# Patient Record
Sex: MALE | Race: OTHER | HISPANIC OR LATINO | Employment: UNEMPLOYED | URBAN - METROPOLITAN AREA
[De-identification: names, ages, dates, MRNs, and addresses within clinical notes are randomized per-mention and may not be internally consistent; named-entity substitution may affect disease eponyms.]

---

## 2023-04-13 ENCOUNTER — HOSPITAL ENCOUNTER (INPATIENT)
Facility: MEDICAL CENTER | Age: 18
LOS: 4 days | DRG: 123 | End: 2023-04-18
Attending: EMERGENCY MEDICINE | Admitting: FAMILY MEDICINE
Payer: COMMERCIAL

## 2023-04-13 DIAGNOSIS — H49.21 RIGHT ABDUCENS NERVE PALSY: ICD-10-CM

## 2023-04-13 DIAGNOSIS — H49.21 LATERAL RECTUS PALSY, RIGHT: ICD-10-CM

## 2023-04-13 PROBLEM — H49.20 LATERAL RECTUS PALSY: Status: ACTIVE | Noted: 2023-04-13

## 2023-04-13 LAB
ALBUMIN SERPL BCP-MCNC: 4.3 G/DL (ref 3.2–4.9)
ALBUMIN/GLOB SERPL: 1.3 G/DL
ALP SERPL-CCNC: 120 U/L (ref 30–99)
ALT SERPL-CCNC: 23 U/L (ref 2–50)
ANION GAP SERPL CALC-SCNC: 11 MMOL/L (ref 7–16)
AST SERPL-CCNC: 15 U/L (ref 12–45)
BASOPHILS # BLD AUTO: 0.4 % (ref 0–1.8)
BASOPHILS # BLD: 0.05 K/UL (ref 0–0.12)
BILIRUB SERPL-MCNC: 0.5 MG/DL (ref 0.1–1.5)
BUN SERPL-MCNC: 12 MG/DL (ref 8–22)
CALCIUM ALBUM COR SERPL-MCNC: 9.1 MG/DL (ref 8.5–10.5)
CALCIUM SERPL-MCNC: 9.3 MG/DL (ref 8.5–10.5)
CHLORIDE SERPL-SCNC: 107 MMOL/L (ref 96–112)
CO2 SERPL-SCNC: 24 MMOL/L (ref 20–33)
CREAT SERPL-MCNC: 0.66 MG/DL (ref 0.5–1.4)
CRP SERPL HS-MCNC: <0.3 MG/DL (ref 0–0.75)
EOSINOPHIL # BLD AUTO: 0.36 K/UL (ref 0–0.51)
EOSINOPHIL NFR BLD: 2.8 % (ref 0–6.9)
ERYTHROCYTE [DISTWIDTH] IN BLOOD BY AUTOMATED COUNT: 42.6 FL (ref 35.9–50)
ERYTHROCYTE [SEDIMENTATION RATE] IN BLOOD BY WESTERGREN METHOD: 5 MM/HOUR (ref 0–20)
GFR SERPLBLD CREATININE-BSD FMLA CKD-EPI: 138 ML/MIN/1.73 M 2
GLOBULIN SER CALC-MCNC: 3.2 G/DL (ref 1.9–3.5)
GLUCOSE SERPL-MCNC: 89 MG/DL (ref 65–99)
HCT VFR BLD AUTO: 47.3 % (ref 42–52)
HGB BLD-MCNC: 15.8 G/DL (ref 14–18)
IMM GRANULOCYTES # BLD AUTO: 0.07 K/UL (ref 0–0.11)
IMM GRANULOCYTES NFR BLD AUTO: 0.5 % (ref 0–0.9)
LYMPHOCYTES # BLD AUTO: 2.56 K/UL (ref 1–4.8)
LYMPHOCYTES NFR BLD: 19.6 % (ref 22–41)
MCH RBC QN AUTO: 30.7 PG (ref 27–33)
MCHC RBC AUTO-ENTMCNC: 33.4 G/DL (ref 33.7–35.3)
MCV RBC AUTO: 92 FL (ref 81.4–97.8)
MONOCYTES # BLD AUTO: 0.9 K/UL (ref 0–0.85)
MONOCYTES NFR BLD AUTO: 6.9 % (ref 0–13.4)
NEUTROPHILS # BLD AUTO: 9.12 K/UL (ref 1.82–7.42)
NEUTROPHILS NFR BLD: 69.8 % (ref 44–72)
NRBC # BLD AUTO: 0 K/UL
NRBC BLD-RTO: 0 /100 WBC
PLATELET # BLD AUTO: 285 K/UL (ref 164–446)
PMV BLD AUTO: 9.6 FL (ref 9–12.9)
POTASSIUM SERPL-SCNC: 4 MMOL/L (ref 3.6–5.5)
PROT SERPL-MCNC: 7.5 G/DL (ref 6–8.2)
RBC # BLD AUTO: 5.14 M/UL (ref 4.7–6.1)
SODIUM SERPL-SCNC: 142 MMOL/L (ref 135–145)
TSH SERPL DL<=0.005 MIU/L-ACNC: 0.49 UIU/ML (ref 0.38–5.33)
VIT B12 SERPL-MCNC: >4000 PG/ML (ref 211–911)
WBC # BLD AUTO: 13.1 K/UL (ref 4.8–10.8)

## 2023-04-13 PROCEDURE — 700101 HCHG RX REV CODE 250: Performed by: EMERGENCY MEDICINE

## 2023-04-13 PROCEDURE — 85652 RBC SED RATE AUTOMATED: CPT

## 2023-04-13 PROCEDURE — 86140 C-REACTIVE PROTEIN: CPT

## 2023-04-13 PROCEDURE — A9270 NON-COVERED ITEM OR SERVICE: HCPCS | Performed by: FAMILY MEDICINE

## 2023-04-13 PROCEDURE — 85025 COMPLETE CBC W/AUTO DIFF WBC: CPT

## 2023-04-13 PROCEDURE — 700102 HCHG RX REV CODE 250 W/ 637 OVERRIDE(OP): Performed by: FAMILY MEDICINE

## 2023-04-13 PROCEDURE — G0378 HOSPITAL OBSERVATION PER HR: HCPCS

## 2023-04-13 PROCEDURE — 80053 COMPREHEN METABOLIC PANEL: CPT

## 2023-04-13 PROCEDURE — 84443 ASSAY THYROID STIM HORMONE: CPT

## 2023-04-13 PROCEDURE — 99285 EMERGENCY DEPT VISIT HI MDM: CPT

## 2023-04-13 PROCEDURE — 99222 1ST HOSP IP/OBS MODERATE 55: CPT | Performed by: FAMILY MEDICINE

## 2023-04-13 PROCEDURE — 82607 VITAMIN B-12: CPT

## 2023-04-13 RX ORDER — PROMETHAZINE HYDROCHLORIDE 25 MG/1
12.5-25 TABLET ORAL EVERY 4 HOURS PRN
Status: DISCONTINUED | OUTPATIENT
Start: 2023-04-13 | End: 2023-04-18 | Stop reason: HOSPADM

## 2023-04-13 RX ORDER — OXYCODONE HYDROCHLORIDE 5 MG/1
5 TABLET ORAL
Status: DISCONTINUED | OUTPATIENT
Start: 2023-04-13 | End: 2023-04-18 | Stop reason: HOSPADM

## 2023-04-13 RX ORDER — ONDANSETRON 4 MG/1
4 TABLET, ORALLY DISINTEGRATING ORAL EVERY 4 HOURS PRN
Status: DISCONTINUED | OUTPATIENT
Start: 2023-04-13 | End: 2023-04-18 | Stop reason: HOSPADM

## 2023-04-13 RX ORDER — PROCHLORPERAZINE EDISYLATE 5 MG/ML
5-10 INJECTION INTRAMUSCULAR; INTRAVENOUS EVERY 4 HOURS PRN
Status: DISCONTINUED | OUTPATIENT
Start: 2023-04-13 | End: 2023-04-18 | Stop reason: HOSPADM

## 2023-04-13 RX ORDER — BISACODYL 10 MG
10 SUPPOSITORY, RECTAL RECTAL
Status: DISCONTINUED | OUTPATIENT
Start: 2023-04-13 | End: 2023-04-18 | Stop reason: HOSPADM

## 2023-04-13 RX ORDER — POLYETHYLENE GLYCOL 3350 17 G/17G
1 POWDER, FOR SOLUTION ORAL
Status: DISCONTINUED | OUTPATIENT
Start: 2023-04-13 | End: 2023-04-18 | Stop reason: HOSPADM

## 2023-04-13 RX ORDER — PROPARACAINE HYDROCHLORIDE 5 MG/ML
1 SOLUTION/ DROPS OPHTHALMIC ONCE
Status: COMPLETED | OUTPATIENT
Start: 2023-04-13 | End: 2023-04-13

## 2023-04-13 RX ORDER — AMOXICILLIN 250 MG
2 CAPSULE ORAL 2 TIMES DAILY
Status: DISCONTINUED | OUTPATIENT
Start: 2023-04-13 | End: 2023-04-18 | Stop reason: HOSPADM

## 2023-04-13 RX ORDER — OXYCODONE HYDROCHLORIDE 5 MG/1
2.5 TABLET ORAL
Status: DISCONTINUED | OUTPATIENT
Start: 2023-04-13 | End: 2023-04-18 | Stop reason: HOSPADM

## 2023-04-13 RX ORDER — MORPHINE SULFATE 4 MG/ML
2 INJECTION INTRAVENOUS
Status: DISCONTINUED | OUTPATIENT
Start: 2023-04-13 | End: 2023-04-18 | Stop reason: HOSPADM

## 2023-04-13 RX ORDER — ACETAMINOPHEN 325 MG/1
650 TABLET ORAL EVERY 6 HOURS PRN
Status: DISCONTINUED | OUTPATIENT
Start: 2023-04-13 | End: 2023-04-18 | Stop reason: HOSPADM

## 2023-04-13 RX ORDER — ONDANSETRON 2 MG/ML
4 INJECTION INTRAMUSCULAR; INTRAVENOUS EVERY 4 HOURS PRN
Status: DISCONTINUED | OUTPATIENT
Start: 2023-04-13 | End: 2023-04-18 | Stop reason: HOSPADM

## 2023-04-13 RX ORDER — PROMETHAZINE HYDROCHLORIDE 12.5 MG/1
12.5-25 SUPPOSITORY RECTAL EVERY 4 HOURS PRN
Status: DISCONTINUED | OUTPATIENT
Start: 2023-04-13 | End: 2023-04-18 | Stop reason: HOSPADM

## 2023-04-13 RX ADMIN — FLUORESCEIN SODIUM 1 MG: 1 STRIP OPHTHALMIC at 12:30

## 2023-04-13 RX ADMIN — DOCUSATE SODIUM 50 MG AND SENNOSIDES 8.6 MG 2 TABLET: 8.6; 5 TABLET, FILM COATED ORAL at 18:00

## 2023-04-13 RX ADMIN — OXYCODONE 5 MG: 5 TABLET ORAL at 21:22

## 2023-04-13 RX ADMIN — OXYCODONE 2.5 MG: 5 TABLET ORAL at 17:41

## 2023-04-13 RX ADMIN — PROPARACAINE HYDROCHLORIDE 1 DROP: 5 SOLUTION/ DROPS OPHTHALMIC at 12:30

## 2023-04-13 ASSESSMENT — ENCOUNTER SYMPTOMS
SENSORY CHANGE: 0
WHEEZING: 0
ABDOMINAL PAIN: 0
CHILLS: 0
TREMORS: 0
FLANK PAIN: 0
MYALGIAS: 0
EYE DISCHARGE: 0
VOMITING: 0
DIAPHORESIS: 0
FEVER: 0
EYE PAIN: 1
HEARTBURN: 0
EYE REDNESS: 0
SHORTNESS OF BREATH: 0
PALPITATIONS: 0
COUGH: 0
DOUBLE VISION: 1
BLURRED VISION: 0
DIZZINESS: 1
TINGLING: 0
BACK PAIN: 0
DIARRHEA: 0
NERVOUS/ANXIOUS: 0
WEAKNESS: 0
NECK PAIN: 0
FOCAL WEAKNESS: 0
HEADACHES: 1
SPEECH CHANGE: 0
NAUSEA: 0
SORE THROAT: 0

## 2023-04-13 ASSESSMENT — LIFESTYLE VARIABLES
ALCOHOL_USE: NO
ON A TYPICAL DAY WHEN YOU DRINK ALCOHOL HOW MANY DRINKS DO YOU HAVE: 0
CONSUMPTION TOTAL: NEGATIVE
TOTAL SCORE: 0
HOW MANY TIMES IN THE PAST YEAR HAVE YOU HAD 5 OR MORE DRINKS IN A DAY: 0
HAVE PEOPLE ANNOYED YOU BY CRITICIZING YOUR DRINKING: NO
DOES PATIENT WANT TO STOP DRINKING: NO
HAVE YOU EVER FELT YOU SHOULD CUT DOWN ON YOUR DRINKING: NO
EVER HAD A DRINK FIRST THING IN THE MORNING TO STEADY YOUR NERVES TO GET RID OF A HANGOVER: NO
TOTAL SCORE: 0
TOTAL SCORE: 0
EVER FELT BAD OR GUILTY ABOUT YOUR DRINKING: NO
AVERAGE NUMBER OF DAYS PER WEEK YOU HAVE A DRINK CONTAINING ALCOHOL: 0

## 2023-04-13 ASSESSMENT — PATIENT HEALTH QUESTIONNAIRE - PHQ9
2. FEELING DOWN, DEPRESSED, IRRITABLE, OR HOPELESS: NOT AT ALL
SUM OF ALL RESPONSES TO PHQ9 QUESTIONS 1 AND 2: 0
1. LITTLE INTEREST OR PLEASURE IN DOING THINGS: NOT AT ALL

## 2023-04-13 ASSESSMENT — PAIN DESCRIPTION - PAIN TYPE
TYPE: ACUTE PAIN

## 2023-04-13 NOTE — PROGRESS NOTES
4 Eyes Skin Assessment Completed by Holland RN and Patricia RN.    Head WDL  Ears WDL  Nose WDL  Mouth WDL  Neck WDL  Breast/Chest WDL  Shoulder Blades WDL  Spine WDL  (R) Arm/Elbow/Hand WDL  (L) Arm/Elbow/Hand WDL  Abdomen WDL  Groin WDL  Scrotum/Coccyx/Buttocks WDL  (R) Leg WDL  (L) Leg WDL  (R) Heel/Foot/Toe WDL  (L) Heel/Foot/Toe WDL    Devices In Places Blood Pressure Cuff    Interventions In Place Pillows    Possible Skin Injury No    Pictures Uploaded Into Epic N/A  Wound Consult Placed N/A  RN Wound Prevention Protocol Ordered No

## 2023-04-13 NOTE — ASSESSMENT & PLAN NOTE
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

## 2023-04-13 NOTE — ED TRIAGE NOTES
"Chief Complaint   Patient presents with    Eye Pain     Symptoms started 3 weeks ago. Right eye. Blurred vision when pt turns head to the right. Associated with sensation of pain/numbness surround the right eye.        /74   Pulse 77   Temp 36.4 °C (97.6 °F) (Temporal)   Resp 18   Ht 1.8 m (5' 10.87\")   Wt 89.2 kg (196 lb 10.4 oz)   SpO2 98%   BMI 27.53 kg/m²     "

## 2023-04-13 NOTE — ED PROVIDER NOTES
ED Provider Note    CHIEF COMPLAINT  Chief Complaint   Patient presents with    Eye Pain     Symptoms started 3 weeks ago. Right eye. Blurred vision when pt turns head to the right. Associated with sensation of pain/numbness surround the right eye.        EXTERNAL RECORDS REVIEWED  External ED Note P patient was seen at [facility] last week for the same symptoms.  CT and MRI were normal.    HPI/ROS  LIMITATION TO HISTORY   Select: : None  OUTSIDE HISTORIAN(S):  Parent Mother who is at bedside.    Lopez Engle is a 18 y.o. male who presents to the ED from [facility] for Opthalmology referral secondary to right eye pain onset three weeks ago. He also has associated double vision when he turns his head to the side. The patient is a  and recalls 3 weeks ago, when he was using a marcial, he had sudden onset pain in the eye when he attempted to turn his head to the right. He also had pain in the area around his eye and into the right temporal region whenever he would rotate his head to the right. His vision and pain subside when he is looking straight on. He was seen at [facility] today, where labs and an MRI and CT were performed, and were non concerning. He was discharged with a lateral rectus palsy diagnosis. He denies any associated fever or signs of infection.    PAST MEDICAL HISTORY   None noted on chart review    SURGICAL HISTORY  patient denies any surgical history    FAMILY HISTORY  History reviewed. No pertinent family history.    SOCIAL HISTORY  Social History     Tobacco Use    Smoking status: Never    Smokeless tobacco: Never   Vaping Use    Vaping Use: Never used   Substance and Sexual Activity    Alcohol use: Not Currently    Drug use: Not Currently    Sexual activity: None noted       CURRENT MEDICATIONS  Home Medications       Reviewed by Rodríguez Kearney (Pharmacy Tech) on 04/13/23 at 1329  Med List Status: Complete     Medication Last Dose Status        Patient Moustapha Taking any Medications       "                     ALLERGIES  No Known Allergies    PHYSICAL EXAM  VITAL SIGNS: /72   Pulse 83   Temp 36.7 °C (98.1 °F) (Temporal)   Resp 16   Ht 1.8 m (5' 10.87\")   Wt 89.2 kg (196 lb 10.4 oz)   SpO2 99%   BMI 27.53 kg/m²    Constitutional: Alert in no apparent distress.  HENT: No signs of trauma, Bilateral external ears normal, Nose normal.   Eyes: Eye pressure is normal. No abnormality on slip light exam. No uptake of fluorescein. Pupils are equal and reactive, Conjunctiva normal, Non-icteric.   Cardiovascular: Regular rate and rhythm, no murmurs.   Thorax & Lungs: Normal breath sounds, No respiratory distress, No wheezing, No chest tenderness.   Skin: Warm, Dry, No erythema, No rash.   Musculoskeletal:  No major deformities noted.   Neurologic: Alert, moving all extremities without difficulty, no focal deficits.      DIAGNOSTIC STUDIES / PROCEDURES    LABS  Labs Reviewed - No data to display        COURSE & MEDICAL DECISION MAKING    ED Observation Status? No.    12:24 PM - Patient seen and examined at bedside. Performed eye exam with the fluorescent slit lamp at bedside. Discussed plan of care, including eventual discharge with ophthalmology referral. Patient agrees to the plan of care.     12:50 PM I discussed the patient's case and the above findings with Dr. Lee (ophthalmology) who is concerned for MS due to nerve palsy in a young person. He would like the patient to undergo an MS work up at this time. He advises ordering an MRI of the brain and orbits with and without contrast.    1:17 PM- Informed patient and patient's mother of the plan for hospitalization for a full MS work up. Lab work will be ordered at this time. Patient is agreeable to this plan.    1:24 PM I discussed the patient's case and the above findings with Dr. Frank (hospitalist) who agrees to hospitalize the patient and take over the plan of care moving forward.     INITIAL ASSESSMENT, COURSE AND PLAN  Care Narrative: " This is a 18-year-old who presents with 3 weeks of a right lateral rectus palsy.  Patient has normal pressures and no evidence of corneal abrasion or abnormality on slit-lamp and fluorescein exams.  He has normal pressures of his eyes bilaterally.  Given the lateral rectus palsy I did speak with Dr. Lee, ophthalmology who recommended doing an MS work-up.  He recommended a MRI of the head and orbits with and without contrast to look for this.  He can be contacted if there is not any neurologic cause of the patient's symptoms.  We do have neurology if he does have MS that he can see.  I do not think this is an emergent need to call and consult neurology as this has been going on for 3 weeks.  I do think he needs hospitalization for this work-up.          DISPOSITION AND DISCUSSIONS  I have discussed management of the patient with the following physicians and SHRADDHA's:  Dr. Frank (hospitalist)    Discussion of management with other [facility] or appropriate source(s):  Dr. Lee (Ophthalmology)       Escalation of care considered, and ultimately not performed:diagnostic imaging    Barriers to care at this time, including but not limited to:  Patient has not followed up with outpatient neurology or ophthalmology because he does not have these services established. .     FINAL DIAGNOSIS  1. Lateral rectus palsy, right          ILisa (Jamel), am scribing for, and in the presence of, Valeri Marcial M.D..    Electronically signed by: Lisa Carrillo (Jamel), 4/13/2023    Valeri PUENTE M.D. personally performed the services described in this documentation, as scribed by Lisa Carrillo in my presence, and it is both accurate and complete.    Electronically signed by: Valeri Marcial M.D., 4/13/2023 12:02 PM

## 2023-04-13 NOTE — ED NOTES
ERP at bedside now. Opthaine and ophthalmic strip at bedside along with slit lamp. No other needs.

## 2023-04-13 NOTE — H&P
Hospital Medicine History & Physical Note    Date of Service  4/13/2023    Primary Care Physician  No primary care provider on file.    Consultants  ophthalmology    Specialist Names: Jesus    Code Status  Full Code    Chief Complaint  Chief Complaint   Patient presents with    Eye Pain     Symptoms started 3 weeks ago. Right eye. Blurred vision when pt turns head to the right. Associated with sensation of pain/numbness surround the right eye.        History of Presenting Illness  Lopez Engle is a 18 y.o. male who presented 4/13/2023 with diplopia.  Patient with diplopia or double vision with right eye pain for the past 3 weeks.  He was seen in outside facility where MRI of the brain was noted to be negative.  He was told that he needed to go to our facility for further work-up if it does not resolve.  Patient continues to have diplopia, appears to have right lateral rectus palsy.  ED called ophthalmology who recommended MRI of brain and orbits with and without contrast for further work-up.  If MRIs are negative, ophthalmology asked to be reconsulted or called back.  Patient with some headache and dizziness, he denies any other symptoms.  He has no demonstrable numbness or weakness of both upper and lower extremities.    I discussed the plan of care with patient and family.    A qualified  was used to interpret [place] during this encounter.  ’s name/ID number was [id number] and mode of interpretation was iPAD.     Review of Systems  Review of Systems   Constitutional:  Negative for chills, diaphoresis, fever and malaise/fatigue.   HENT:  Negative for congestion, hearing loss and sore throat.    Eyes:  Positive for double vision and pain. Negative for blurred vision, discharge and redness.   Respiratory:  Negative for cough, shortness of breath and wheezing.    Cardiovascular:  Negative for chest pain, palpitations and leg swelling.   Gastrointestinal:  Negative for abdominal pain,  diarrhea, heartburn, nausea and vomiting.   Genitourinary:  Negative for dysuria, flank pain and hematuria.   Musculoskeletal:  Negative for back pain, joint pain, myalgias and neck pain.   Skin:  Negative for rash.   Neurological:  Positive for dizziness and headaches. Negative for tingling, tremors, sensory change, speech change, focal weakness and weakness.   Psychiatric/Behavioral:  The patient is not nervous/anxious.      Past Medical History   has a past medical history of Lateral rectus palsy.    Surgical History   has a past surgical history that includes no pertinent past surgical history.     Family History  Family history is unknown by patient.   Family history reviewed with patient. There is no family history that is pertinent to the chief complaint.     Social History   reports that he has never smoked. He has never used smokeless tobacco. He reports that he does not currently use alcohol. He reports that he does not currently use drugs.    Allergies  No Known Allergies    Medications  None       Physical Exam  Temp:  [36.4 °C (97.6 °F)-37.1 °C (98.7 °F)] 37.1 °C (98.7 °F)  Pulse:  [62-83] 68  Resp:  [16-18] 16  BP: (125-151)/(60-84) 136/84  SpO2:  [97 %-100 %] 97 %  Blood Pressure: 136/84   Temperature: 37.1 °C (98.7 °F)   Pulse: 68   Respiration: 16   Pulse Oximetry: 97 %       Physical Exam  Vitals and nursing note reviewed.   HENT:      Head: Normocephalic and atraumatic.      Nose: No congestion.      Mouth/Throat:      Mouth: Mucous membranes are moist.   Eyes:      Conjunctiva/sclera: Conjunctivae normal.      Right eye: Right conjunctiva is not injected. No chemosis, exudate or hemorrhage.     Left eye: Left conjunctiva is not injected. No chemosis, exudate or hemorrhage.     Pupils: Pupils are equal, round, and reactive to light.      Comments: OD with no lateral deviation   Cardiovascular:      Rate and Rhythm: Normal rate and regular rhythm.   Pulmonary:      Effort: Pulmonary effort is  normal.      Breath sounds: Normal breath sounds.   Abdominal:      General: There is no distension.      Tenderness: There is no abdominal tenderness. There is no guarding or rebound.   Musculoskeletal:      Cervical back: No tenderness.   Neurological:      General: No focal deficit present.      Mental Status: He is alert and oriented to person, place, and time.      Cranial Nerves: Cranial nerve deficit (OD with no lateral deviation) present.       Laboratory:          No results for input(s): ALTSGPT, ASTSGOT, ALKPHOSPHAT, TBILIRUBIN, DBILIRUBIN, GAMMAGT, AMYLASE, LIPASE, ALB, PREALBUMIN, GLUCOSE in the last 72 hours.      No results for input(s): NTPROBNP in the last 72 hours.      No results for input(s): TROPONINT in the last 72 hours.    Imaging:  MR-BRAIN-WITH & W/O    (Results Pending)   MR-ORBITS, FACE, NECK-WITH    (Results Pending)       no X-Ray or EKG requiring interpretation    Assessment/Plan:  Justification for Admission Status  I anticipate this patient is appropriate for observation status at this time because lateral rectus palsy work-up    Patient will need a Med/Surg bed on MEDICAL service .  The need is secondary to lateral rectus palsy.    * Lateral rectus palsy- (present on admission)  Assessment & Plan  Neurochecks  Check CBC, CMP, vitamin B12, TSH, ESR, CRP  Check MRI brain and orbits  May need neurology versus ophthalmology consults        VTE prophylaxis: SCDs/TEDs

## 2023-04-13 NOTE — ED NOTES
This RN attempted to call report and spoke to Sherif. RN unavailable and to call me back for report.     Transport at bedside now to take patient.

## 2023-04-14 ENCOUNTER — APPOINTMENT (OUTPATIENT)
Dept: RADIOLOGY | Facility: MEDICAL CENTER | Age: 18
DRG: 123 | End: 2023-04-14
Attending: FAMILY MEDICINE
Payer: COMMERCIAL

## 2023-04-14 ENCOUNTER — APPOINTMENT (OUTPATIENT)
Dept: RADIOLOGY | Facility: MEDICAL CENTER | Age: 18
DRG: 123 | End: 2023-04-14
Attending: PSYCHIATRY & NEUROLOGY
Payer: COMMERCIAL

## 2023-04-14 LAB
GLUCOSE CSF-MCNC: 66 MG/DL (ref 40–80)
GRAM STN SPEC: NORMAL
HIV 1+2 AB+HIV1 P24 AG SERPL QL IA: NORMAL
PROT CSF-MCNC: 24 MG/DL (ref 15–45)
SIGNIFICANT IND 70042: NORMAL
SITE SITE: NORMAL
SOURCE SOURCE: NORMAL

## 2023-04-14 PROCEDURE — 36415 COLL VENOUS BLD VENIPUNCTURE: CPT

## 2023-04-14 PROCEDURE — 87205 SMEAR GRAM STAIN: CPT | Mod: 91

## 2023-04-14 PROCEDURE — 87389 HIV-1 AG W/HIV-1&-2 AB AG IA: CPT

## 2023-04-14 PROCEDURE — 87798 DETECT AGENT NOS DNA AMP: CPT

## 2023-04-14 PROCEDURE — 770001 HCHG ROOM/CARE - MED/SURG/GYN PRIV*

## 2023-04-14 PROCEDURE — 700117 HCHG RX CONTRAST REV CODE 255: Performed by: FAMILY MEDICINE

## 2023-04-14 PROCEDURE — 87116 MYCOBACTERIA CULTURE: CPT

## 2023-04-14 PROCEDURE — 82164 ANGIOTENSIN I ENZYME TEST: CPT

## 2023-04-14 PROCEDURE — 87102 FUNGUS ISOLATION CULTURE: CPT

## 2023-04-14 PROCEDURE — 62270 DX LMBR SPI PNXR: CPT | Performed by: HOSPITALIST

## 2023-04-14 PROCEDURE — 87206 SMEAR FLUORESCENT/ACID STAI: CPT

## 2023-04-14 PROCEDURE — 86592 SYPHILIS TEST NON-TREP QUAL: CPT

## 2023-04-14 PROCEDURE — 70542 MRI ORBIT/FACE/NECK W/DYE: CPT

## 2023-04-14 PROCEDURE — 70553 MRI BRAIN STEM W/O & W/DYE: CPT

## 2023-04-14 PROCEDURE — A9579 GAD-BASE MR CONTRAST NOS,1ML: HCPCS | Performed by: FAMILY MEDICINE

## 2023-04-14 PROCEDURE — 84157 ASSAY OF PROTEIN OTHER: CPT

## 2023-04-14 PROCEDURE — 89051 BODY FLUID CELL COUNT: CPT

## 2023-04-14 PROCEDURE — 700117 HCHG RX CONTRAST REV CODE 255: Performed by: PSYCHIATRY & NEUROLOGY

## 2023-04-14 PROCEDURE — 87070 CULTURE OTHR SPECIMN AEROBIC: CPT

## 2023-04-14 PROCEDURE — 86480 TB TEST CELL IMMUN MEASURE: CPT

## 2023-04-14 PROCEDURE — 86038 ANTINUCLEAR ANTIBODIES: CPT

## 2023-04-14 PROCEDURE — 86235 NUCLEAR ANTIGEN ANTIBODY: CPT | Mod: 91

## 2023-04-14 PROCEDURE — 99232 SBSQ HOSP IP/OBS MODERATE 35: CPT | Mod: 25 | Performed by: FAMILY MEDICINE

## 2023-04-14 PROCEDURE — 82945 GLUCOSE OTHER FLUID: CPT

## 2023-04-14 PROCEDURE — 700102 HCHG RX REV CODE 250 W/ 637 OVERRIDE(OP): Performed by: FAMILY MEDICINE

## 2023-04-14 PROCEDURE — A9270 NON-COVERED ITEM OR SERVICE: HCPCS | Performed by: FAMILY MEDICINE

## 2023-04-14 PROCEDURE — 83516 IMMUNOASSAY NONANTIBODY: CPT | Mod: 91

## 2023-04-14 PROCEDURE — 71260 CT THORAX DX C+: CPT

## 2023-04-14 PROCEDURE — 009U3ZX DRAINAGE OF SPINAL CANAL, PERCUTANEOUS APPROACH, DIAGNOSTIC: ICD-10-PCS | Performed by: HOSPITALIST

## 2023-04-14 PROCEDURE — 99222 1ST HOSP IP/OBS MODERATE 55: CPT | Performed by: PSYCHIATRY & NEUROLOGY

## 2023-04-14 RX ADMIN — OXYCODONE 5 MG: 5 TABLET ORAL at 05:20

## 2023-04-14 RX ADMIN — ACETAMINOPHEN 650 MG: 325 TABLET, FILM COATED ORAL at 17:44

## 2023-04-14 RX ADMIN — OXYCODONE 5 MG: 5 TABLET ORAL at 08:35

## 2023-04-14 RX ADMIN — OXYCODONE 5 MG: 5 TABLET ORAL at 01:00

## 2023-04-14 RX ADMIN — IOHEXOL 70 ML: 350 INJECTION, SOLUTION INTRAVENOUS at 23:15

## 2023-04-14 RX ADMIN — OXYCODONE 5 MG: 5 TABLET ORAL at 21:01

## 2023-04-14 RX ADMIN — GADOTERIDOL 20 ML: 279.3 INJECTION, SOLUTION INTRAVENOUS at 02:38

## 2023-04-14 RX ADMIN — DOCUSATE SODIUM 50 MG AND SENNOSIDES 8.6 MG 2 TABLET: 8.6; 5 TABLET, FILM COATED ORAL at 05:20

## 2023-04-14 ASSESSMENT — ENCOUNTER SYMPTOMS
NECK PAIN: 0
NERVOUS/ANXIOUS: 0
HEADACHES: 1
CHILLS: 0
SENSORY CHANGE: 0
SORE THROAT: 0
FOCAL WEAKNESS: 0
VOMITING: 0
HEARTBURN: 0
WHEEZING: 0
COUGH: 0
DIZZINESS: 1
SPEECH CHANGE: 0
DOUBLE VISION: 1
SHORTNESS OF BREATH: 0
FEVER: 0
NAUSEA: 0
DIARRHEA: 0
MYALGIAS: 0
FLANK PAIN: 0
WEAKNESS: 0
PALPITATIONS: 0
EYE PAIN: 1
BLURRED VISION: 0
ABDOMINAL PAIN: 0
BACK PAIN: 0

## 2023-04-14 ASSESSMENT — PAIN SCALES - WONG BAKER
WONGBAKER_NUMERICALRESPONSE: HURTS A LITTLE MORE
WONGBAKER_NUMERICALRESPONSE: HURTS EVEN MORE

## 2023-04-14 ASSESSMENT — COGNITIVE AND FUNCTIONAL STATUS - GENERAL
SUGGESTED CMS G CODE MODIFIER MOBILITY: CH
MOBILITY SCORE: 24
SUGGESTED CMS G CODE MODIFIER DAILY ACTIVITY: CH
DAILY ACTIVITIY SCORE: 24

## 2023-04-14 ASSESSMENT — PAIN DESCRIPTION - PAIN TYPE: TYPE: ACUTE PAIN

## 2023-04-14 NOTE — PROGRESS NOTES
Pt awake,a&ox4,was c/o pain on right eye,medicated,pt ambulates,c/o mild numbness of right face,awaiting for neurologist.

## 2023-04-14 NOTE — CARE PLAN
The patient is Stable - Low risk of patient condition declining or worsening    Shift Goals  Clinical Goals: MRI, pain control  Patient Goals: pain relief  Family Goals: feel better    Progress made toward(s) clinical / shift goals:      Problem: Knowledge Deficit - Standard  Goal: Patient and family/care givers will demonstrate understanding of plan of care, disease process/condition, diagnostic tests and medications  Outcome: Progressing     Problem: Pain - Standard  Goal: Alleviation of pain or a reduction in pain to the patient’s comfort goal  Outcome: Progressing       Patient is not progressing towards the following goals:

## 2023-04-14 NOTE — PROGRESS NOTES
Eye patch placed at 0100, on for 6 hours and off for 6 hours, updated patient, understands directions.    MRI completed.

## 2023-04-14 NOTE — CARE PLAN
The patient is Watcher - Medium risk of patient condition declining or worsening    Shift Goals  Clinical Goals: feel better  Patient Goals: discharge  Family Goals: feel better    Progress made toward(s) clinical / shift goals:  observation/neuro consult    Patient is not progressing towards the following goals:      Problem: Knowledge Deficit - Standard  Goal: Patient and family/care givers will demonstrate understanding of plan of care, disease process/condition, diagnostic tests and medications  Outcome: Progressing     Problem: Pain - Standard  Goal: Alleviation of pain or a reduction in pain to the patient’s comfort goal  Outcome: Progressing     Problem: Fall Risk  Goal: Patient will remain free from falls  Outcome: Progressing

## 2023-04-14 NOTE — CARE PLAN
The patient is Watcher - Medium risk of patient condition declining or worsening    Shift Goals  Clinical Goals: Monitor pain, tests  Patient Goals: rest, pain control    Progress made toward(s) clinical / shift goals:  Education started. Pain controlled.     Patient is not progressing towards the following goals:

## 2023-04-14 NOTE — PROGRESS NOTES
Hospital Medicine Daily Progress Note    Date of Service  4/14/2023    Chief Complaint  Lopez Engle is a 18 y.o. male admitted 4/13/2023 with Diplopia    Hospital Course  Admitted with diplopia. He was noted to have probable right lateral rectus palsy.    Interval Problem Update  Lateral rectus palsy - MRI results reviewed, discussed case with Neurology    A qualified  was used to interpret [place] during this encounter.  ’s name/ID number was Murphy and mode of interpretation was iPad.     I have discussed this patient's plan of care and discharge plan at IDT rounds today with Case Management, Nursing, Nursing leadership, and other members of the IDT team.    Consultants/Specialty  neurology    Code Status  Full Code    Disposition  Patient is not medically cleared for discharge.   Anticipate discharge to to home with close outpatient follow-up.  I have placed the appropriate orders for post-discharge needs.    Review of Systems  Review of Systems   Constitutional:  Negative for chills and fever.   HENT:  Negative for congestion, hearing loss and sore throat.    Eyes:  Positive for double vision and pain. Negative for blurred vision.   Respiratory:  Negative for cough, shortness of breath and wheezing.    Cardiovascular:  Negative for chest pain, palpitations and leg swelling.   Gastrointestinal:  Negative for abdominal pain, diarrhea, heartburn, nausea and vomiting.   Genitourinary:  Negative for dysuria, flank pain and hematuria.   Musculoskeletal:  Negative for back pain, joint pain, myalgias and neck pain.   Skin:  Negative for rash.   Neurological:  Positive for dizziness and headaches. Negative for sensory change, speech change, focal weakness and weakness.   Psychiatric/Behavioral:  The patient is not nervous/anxious.       Physical Exam  Temp:  [36.3 °C (97.3 °F)-37.2 °C (98.9 °F)] 36.7 °C (98.1 °F)  Pulse:  [52-80] 52  Resp:  [16-18] 16  BP: (116-136)/(66-84) 119/66  SpO2:   [96 %-99 %] 99 %    Physical Exam  Vitals and nursing note reviewed.   HENT:      Head: Normocephalic and atraumatic.      Nose: No congestion.   Eyes:      Conjunctiva/sclera: Conjunctivae normal.      Comments: OD - No lateral deviation    Cardiovascular:      Rate and Rhythm: Normal rate and regular rhythm.   Pulmonary:      Effort: Pulmonary effort is normal.      Breath sounds: Normal breath sounds.   Abdominal:      General: There is no distension.      Tenderness: There is no abdominal tenderness. There is no guarding or rebound.   Musculoskeletal:      Cervical back: No tenderness.      Right lower leg: No edema.      Left lower leg: No edema.   Skin:     General: Skin is warm and dry.   Neurological:      Mental Status: He is alert and oriented to person, place, and time.      Cranial Nerves: Cranial nerve deficit (OD - No lateral deviation) present.      Motor: No weakness.       Fluids  No intake or output data in the 24 hours ending 04/14/23 1459    Laboratory  Recent Labs     04/13/23  1612   WBC 13.1*   RBC 5.14   HEMOGLOBIN 15.8   HEMATOCRIT 47.3   MCV 92.0   MCH 30.7   MCHC 33.4*   RDW 42.6   PLATELETCT 285   MPV 9.6     Recent Labs     04/13/23  1612   SODIUM 142   POTASSIUM 4.0   CHLORIDE 107   CO2 24   GLUCOSE 89   BUN 12   CREATININE 0.66   CALCIUM 9.3                   Imaging  MR-ORBITS, FACE, NECK-WITH   Final Result         Abnormal dural thickening and enhancement involving the right cavernous sinus, parasellar region, extending to the right tentorium. Differential diagnosis includes idiopathic inflammatory pseudotumor, meningioma, inflammatory processes such as Wegener's    granulomatosis, sarcoidosis, less likely lymphoma.      MR-BRAIN-WITH & W/O   Final Result         Abnormal dural thickening and enhancement involving the right cavernous sinus, parasellar region and extending to the right tentorium. Differential diagnosis includes idiopathic inflammatory pseudotumor, meningioma,  inflammatory processes such as    Wegener's granulomatosis,, sarcoidosis, less likely lymphoma.      MR-MRA HEAD-W/O    (Results Pending)   MR-MRA NECK-W/O    (Results Pending)   CT-CHEST (THORAX) WITH    (Results Pending)        Assessment/Plan  * Lateral rectus palsy- (present on admission)  Assessment & Plan  Neurochecks  Eye patch   Will need MRA Head and Neck, CT Chest, LP  Neurology following         VTE prophylaxis: SCDs/TEDs    I have performed a physical exam and reviewed and updated ROS and Plan today (4/14/2023). In review of yesterday's note (4/13/2023), there are no changes except as documented above.

## 2023-04-14 NOTE — PROGRESS NOTES
Assessment completed. Pt A&Ox4. English speaking, able to communicate with this RN. Respirations are even and unlabored on RA. Pt reports 8/10 right eye pain at this time. Monitors applied, VS stable, call light and belongings within reach. POC updated (MRI, pain control). Pt educated on room and call light, pt verbalized understanding. Communication board updated. Needs met. Father at bedside.

## 2023-04-14 NOTE — CONSULTS
Neurology Initial Consult H&P  Neurohospitalist Service, [facility]    Referring Physician: Tono Frank M.D.    Chief Complaint   Patient presents with    Eye Pain     Symptoms started 3 weeks ago. Right eye. Blurred vision when pt turns head to the right. Associated with sensation of pain/numbness surround the right eye.        HPI: Lopez Engle is a 18 y.o. man presenting for whom neurology has been consulted for double vision.    Patient and his mother only speaks Palestinian and history was obtained with the help of the .  Lopez is a 18-year-old male who came to the ER for double vision.  As per the patient he started having double vision approximately 3 weeks ago and it seems to be persistent, he also mention pain behind the right eye and also pain on the right side of his forehead and face.  He also started noticing numbness or weird sensation in the right side of the face.  Patient denied any redness in the eyes or watery eyes.  He is able to see through the right eye but he is experiencing diplopia or double vision especially when he is trying to see to the right side.  Patient was seen at an outside facility where he had an MRI of the brain which was negative and he was told to come to the hospital here.  Patient denied any weakness, speech changes, numbness anywhere else in the body.  Denied any history of tuberculosis or close contact with TB person, incarceration, weight loss.    Review of systems: In addition to what is detailed in the HPI above, all other systems reviewed and are negative.    Past Medical History:    has a past medical history of Lateral rectus palsy.    FHx:  Family history is unknown by patient.    SHx:   reports that he has never smoked. He has never used smokeless tobacco. He reports that he does not currently use alcohol. He reports that he does not currently use drugs.    Allergies:  No Known Allergies    Medications:    Current  Facility-Administered Medications:     senna-docusate (PERICOLACE or SENOKOT S) 8.6-50 MG per tablet 2 Tablet, 2 Tablet, Oral, BID, 2 Tablet at 04/14/23 0520 **AND** polyethylene glycol/lytes (MIRALAX) PACKET 1 Packet, 1 Packet, Oral, QDAY PRN **AND** magnesium hydroxide (MILK OF MAGNESIA) suspension 30 mL, 30 mL, Oral, QDAY PRN **AND** bisacodyl (DULCOLAX) suppository 10 mg, 10 mg, Rectal, QDAY PRN, Tono Frank M.D.    acetaminophen (Tylenol) tablet 650 mg, 650 mg, Oral, Q6HRS PRN, Tono Frank M.D.    Notify provider if pain remains uncontrolled, , , CONTINUOUS **AND** Use the Numeric Rating Scale (NRS), Lucio-Baker Faces (WBF), or FLACC on regular floors and Critical-Care Pain Observation Tool (CPOT) on ICUs/Trauma to assess pain, , , CONTINUOUS **AND** Pulse Ox, , , CONTINUOUS **AND** Pharmacy Consult Request ...Pain Management Review 1 Each, 1 Each, Other, PHARMACY TO DOSE **AND** If patient difficult to arouse and/or has respiratory depression (respiratory rate of 10 or less), stop any opiates that are currently infusing and call a Rapid Response., , , CONTINUOUS, Tono Frank M.D.    oxyCODONE immediate-release (ROXICODONE) tablet 2.5 mg, 2.5 mg, Oral, Q3HRS PRN, 2.5 mg at 04/13/23 1741 **OR** oxyCODONE immediate-release (ROXICODONE) tablet 5 mg, 5 mg, Oral, Q3HRS PRN, 5 mg at 04/14/23 0835 **OR** morphine 4 MG/ML injection 2 mg, 2 mg, Intravenous, Q3HRS PRN, Tono Frank M.D.    ondansetron (ZOFRAN) syringe/vial injection 4 mg, 4 mg, Intravenous, Q4HRS PRN, Tono Frank M.D.    ondansetron (ZOFRAN ODT) dispertab 4 mg, 4 mg, Oral, Q4HRS PRN, Tono Frank M.D.    promethazine (PHENERGAN) tablet 12.5-25 mg, 12.5-25 mg, Oral, Q4HRS PRN, Tono Frank M.D.    promethazine (PHENERGAN) suppository 12.5-25 mg, 12.5-25 mg, Rectal, Q4HRS PRN, Tono Frank M.D.    prochlorperazine (COMPAZINE) injection 5-10 mg, 5-10 mg, Intravenous, Q4HRS PRN, Tono Frank M.D.    Physical  "Examination:     General: Patient is awake and in no acute distress  Eye: Examination of optic disks not indicated at this time given acuity of consult  Neck: There is normal range of motion  CV: regular rate   Extremities:  clear, dry, intact, without peripheral edema    NEUROLOGICAL EXAM:     /66   Pulse (!) 52   Temp 36.7 °C (98.1 °F) (Temporal)   Resp 16   Ht 1.778 m (5' 10\")   Wt 88.9 kg (195 lb 15.8 oz)   SpO2 99%   BMI 28.12 kg/m²       Mental status: Awake, alert and fully oriented  Speech and language: Speech is clear and fluent. The patient is able to name and repeat, and follow commands  Cranial nerve exam: Pupils are equal, round and reactive to light bilaterally. Visual fields are full. There is no nystagmus, Diplopia with inability to perform abduction of right eye.  Reduced light touch sensation on the right side of the face.  Slight nasolabial fold obliteration noted on the right side. Palate elevates symmetrically. Tongue is midline.  Motor exam: There is sustained antigravity with no downward drift in bilateral arms and legs.  There is no pronator drift. Tone is normal. No abnormal movements were seen on exam.  Sensory exam: Reacts to tactile in all 4 extremities, no neglect to double stim   Deep tendon reflexes:  2+ throughout. Toes down-going bilaterally.  Coordination: No ataxia on bilateral finger-to-nose testing  Gait: Deferred due to patient preference    Objective Data:    Labs:  No results found for: PROTHROMBTM, INR   Lab Results   Component Value Date/Time    WBC 13.1 (H) 04/13/2023 04:12 PM    RBC 5.14 04/13/2023 04:12 PM    HEMOGLOBIN 15.8 04/13/2023 04:12 PM    HEMATOCRIT 47.3 04/13/2023 04:12 PM    MCV 92.0 04/13/2023 04:12 PM    MCH 30.7 04/13/2023 04:12 PM    MCHC 33.4 (L) 04/13/2023 04:12 PM    MPV 9.6 04/13/2023 04:12 PM    NEUTSPOLYS 69.80 04/13/2023 04:12 PM    LYMPHOCYTES 19.60 (L) 04/13/2023 04:12 PM    MONOCYTES 6.90 04/13/2023 04:12 PM    EOSINOPHILS 2.80 " 04/13/2023 04:12 PM    BASOPHILS 0.40 04/13/2023 04:12 PM      Lab Results   Component Value Date/Time    SODIUM 142 04/13/2023 04:12 PM    POTASSIUM 4.0 04/13/2023 04:12 PM    CHLORIDE 107 04/13/2023 04:12 PM    CO2 24 04/13/2023 04:12 PM    GLUCOSE 89 04/13/2023 04:12 PM    BUN 12 04/13/2023 04:12 PM    CREATININE 0.66 04/13/2023 04:12 PM    BUNCREATRAT 12.2 04/07/2023 07:45 PM      No results found for: CHOLSTRLTOT, LDL, HDL, TRIGLYCERIDE    Lab Results   Component Value Date/Time    ALKPHOSPHAT 120 (H) 04/13/2023 04:12 PM    ASTSGOT 15 04/13/2023 04:12 PM    ALTSGPT 23 04/13/2023 04:12 PM    TBILIRUBIN 0.5 04/13/2023 04:12 PM        Imaging/Testing:    I interpreted and/or reviewed the patient's neuroimaging    MR-ORBITS, FACE, NECK-WITH   Final Result         Abnormal dural thickening and enhancement involving the right cavernous sinus, parasellar region, extending to the right tentorium. Differential diagnosis includes idiopathic inflammatory pseudotumor, meningioma, inflammatory processes such as Wegener's    granulomatosis, sarcoidosis, less likely lymphoma.      MR-BRAIN-WITH & W/O   Final Result         Abnormal dural thickening and enhancement involving the right cavernous sinus, parasellar region and extending to the right tentorium. Differential diagnosis includes idiopathic inflammatory pseudotumor, meningioma, inflammatory processes such as    Wegener's granulomatosis,, sarcoidosis, less likely lymphoma.      MR-MRA HEAD-W/O    (Results Pending)   MR-MRA NECK-W/O    (Results Pending)   CT-CHEST (THORAX) WITH    (Results Pending)       Assessment and Plan:    Lopez Engle is a 18 y.o. presenting for whom neurology has been consulted for diplopia    Diagnosis:  Right 6th nerve palsy  Right facial numbness  Subtle right facial droop  Abnormal brain MRI    Patient is a young male with no medical comorbidities presenting with symptoms localizing to the 5th, 6th and 7th nerve on the right side with an  abnormal lesion noted in the cavernous sinus on the right side.  Based on the clinical symptoms and the MRI findings there is a long list of differentials including potential vascular causes including aneurysm, fistula, thrombosis or dissection; infection including mucocele, abscess or periostitis, varicella-zoster, fungal infection including mucormycosis or actinomycosis, Treponema pallidum, tuberculosis; other inflammatory disorders including sarcoidosis, Wegener's granulomatosis.  Another rare possibility could be Marion-Hunt syndrome but that is a diagnosis of exclusion and we need to do a thorough work-up before we can confirm the diagnosis.    Recommendations:  -Please get MRA head and neck  -Please order CT chest with contrast  -We will order panel for vasculitis including FLACA, RAMÍREZ, ANCA  -Please get LP and send CSF for testing including cell count, glucose, protein, bacterial culture, viral culture, VZV PCR, ACE levels, VDRL, CSF cultures for TB  -We will order HIV testing to rule out any immunosuppression  -We will also order QuantiFERON test for tuberculosis  -Tylenol as needed for headache  -Patient may benefit from a eye patch      The evaluation of the patient, and recommended management, was discussed with the resident staff.  Thank you for letting me participate in the care for this patient.  Neurology will continue to follow.    Dain Carrera MD  Board certified in Neurology and Epilepsy (ABPN)   Board certified in Headache Medicine (UCNS)  Neurohospitalist, Acute Care Services

## 2023-04-15 ENCOUNTER — APPOINTMENT (OUTPATIENT)
Dept: RADIOLOGY | Facility: MEDICAL CENTER | Age: 18
DRG: 123 | End: 2023-04-15
Attending: HOSPITALIST
Payer: COMMERCIAL

## 2023-04-15 LAB
FUNGUS SPEC FUNGUS STN: NORMAL
RHODAMINE-AURAMINE STN SPEC: NORMAL
SIGNIFICANT IND 70042: NORMAL
SIGNIFICANT IND 70042: NORMAL
SITE SITE: NORMAL
SITE SITE: NORMAL
SOURCE SOURCE: NORMAL
SOURCE SOURCE: NORMAL

## 2023-04-15 PROCEDURE — 700102 HCHG RX REV CODE 250 W/ 637 OVERRIDE(OP): Performed by: FAMILY MEDICINE

## 2023-04-15 PROCEDURE — 99232 SBSQ HOSP IP/OBS MODERATE 35: CPT | Performed by: PSYCHIATRY & NEUROLOGY

## 2023-04-15 PROCEDURE — A9270 NON-COVERED ITEM OR SERVICE: HCPCS | Performed by: FAMILY MEDICINE

## 2023-04-15 PROCEDURE — 770006 HCHG ROOM/CARE - MED/SURG/GYN SEMI*

## 2023-04-15 PROCEDURE — 99232 SBSQ HOSP IP/OBS MODERATE 35: CPT | Performed by: HOSPITALIST

## 2023-04-15 RX ADMIN — ACETAMINOPHEN 650 MG: 325 TABLET, FILM COATED ORAL at 13:04

## 2023-04-15 RX ADMIN — OXYCODONE 2.5 MG: 5 TABLET ORAL at 20:10

## 2023-04-15 RX ADMIN — OXYCODONE 5 MG: 5 TABLET ORAL at 09:33

## 2023-04-15 RX ADMIN — OXYCODONE 5 MG: 5 TABLET ORAL at 05:02

## 2023-04-15 ASSESSMENT — PAIN DESCRIPTION - PAIN TYPE
TYPE: ACUTE PAIN

## 2023-04-15 ASSESSMENT — ENCOUNTER SYMPTOMS
DOUBLE VISION: 1
EYE PAIN: 1
HEADACHES: 1

## 2023-04-15 NOTE — CARE PLAN
The patient is Stable - Low risk of patient condition declining or worsening    Shift Goals  Clinical Goals: Pain management, safety  Patient Goals: Pain control  Family Goals: KEISHA    Progress made toward(s) clinical / shift goals:    Problem: Pain - Standard  Goal: Alleviation of pain or a reduction in pain to the patient’s comfort goal  Description: Target End Date:  Prior to discharge or change in level of care    Document on Vitals flowsheet    1.  Document pain using the appropriate pain scale per order or unit policy  2.  Educate and implement non-pharmacologic comfort measures (i.e. relaxation, distraction, massage, cold/heat therapy, etc.)  3.  Pain management medications as ordered  4.  Reassess pain after pain med administration per policy  5.  If opiods administered assess patient's response to pain medication is appropriate per POSS sedation scale  6.  Follow pain management plan developed in collaboration with patient and interdisciplinary team (including palliative care or pain specialists if applicable)  Outcome: Progressing     Problem: Fall Risk  Goal: Patient will remain free from falls  Description: Target End Date:  Prior to discharge or change in level of care    Document interventions on the [place] Fall Risk Assessment    1.  Assess for fall risk factors  2.  Implement fall precautions  Outcome: Progressing       Patient is not progressing towards the following goals:

## 2023-04-15 NOTE — CARE PLAN
The patient is Stable - Low risk of patient condition declining or worsening    Shift Goals  Clinical Goals: pain management  Patient Goals: pain management, MRI  Family Goals: feel better    Progress made toward(s) clinical / shift goals:    Problem: Pain - Standard  Goal: Alleviation of pain or a reduction in pain to the patient’s comfort goal  Outcome: Progressing     Problem: Fall Risk  Goal: Patient will remain free from falls  Outcome: Progressing       Patient is not progressing towards the following goals:

## 2023-04-15 NOTE — PROCEDURES
Procedure Lumbar Puncture    Date/Time: 4/14/2023 6:20 PM  Performed by: Melchor Padilla M.D.  Authorized by: Melchor Padilla M.D.     Consent:     Consent obtained:  Written    Consent given by:  Patient    Risks discussed:  Infection, pain, nerve damage, bleeding and headache    Alternatives discussed:  No treatment  Universal protocol:     Procedure explained and questions answered to patient or proxy's satisfaction: yes      Relevant documents present and verified: yes      Test results available and properly labeled: yes      Imaging studies available: yes      Required blood products, implants, devices, and special equipment available: yes      Immediately prior to procedure a time out was called: yes      Site/side marked: yes      Patient identity confirmed:  Hospital-assigned identification number  Pre-procedure details:     Procedure purpose:  Diagnostic    Preparation: Patient was prepped and draped in usual sterile fashion    Sedation:     Sedation type:  None  Anesthesia:     Anesthesia method:  Local infiltration    Local anesthetic:  Lidocaine 1% w/o epi  Procedure details:     Lumbar space:  L3-L4 interspace    Patient position:  Sitting    Needle gauge:  20    Needle type:  Spinal needle - Quincke tip    Needle length (in):  3.5    Ultrasound guidance: no      Number of attempts:  1    Fluid appearance:  Clear    Tubes of fluid:  3    Total volume (ml): 14.  Post-procedure:     Puncture site:  Adhesive bandage applied    Patient tolerance of procedure:  Tolerated well, no immediate complications

## 2023-04-15 NOTE — PROGRESS NOTES
Hospital Medicine Daily Progress Note    Date of Service  4/15/2023    Chief Complaint  Lopez Engle is a 18 y.o. male admitted 4/13/2023 with Diplopia    Hospital Course  Admitted with diplopia. He was noted to have probable right lateral rectus palsy.    Interval Problem Update    Patient reports mild eye pain and persistent diplopia when not wearing his eye patch  LP completed prelim test negative so far  Patient is afebrile on room air  HIV serology negative  Sed rate was 5  Review of systems and discussion of medical plan done with assistance of certified  via videoconference  CT chest reviewed negative for acute pathology    I have discussed this patient's plan of care and discharge plan at IDT rounds today with Case Management, Nursing, Nursing leadership, and other members of the IDT team.    Consultants/Specialty  neurology    Code Status  Full Code    Disposition  Patient is not medically cleared for discharge.   Anticipate discharge to to home with close outpatient follow-up.  I have placed the appropriate orders for post-discharge needs.    Review of Systems  Review of Systems   Eyes:  Positive for double vision and pain.   Neurological:  Positive for headaches.   All other systems reviewed and are negative.     Physical Exam  Temp:  [36.2 °C (97.2 °F)-37 °C (98.6 °F)] 36.9 °C (98.5 °F)  Pulse:  [49-95] 95  Resp:  [14-17] 16  BP: (112-131)/(63-71) 123/71  SpO2:  [92 %-100 %] 100 %    Physical Exam  Vitals and nursing note reviewed.   Constitutional:       Appearance: He is well-developed. He is not diaphoretic.   HENT:      Head: Normocephalic and atraumatic.      Mouth/Throat:      Pharynx: No oropharyngeal exudate.   Eyes:      General: No scleral icterus.     Conjunctiva/sclera: Conjunctivae normal.      Pupils: Pupils are equal, round, and reactive to light.      Comments: Right CN VI palsy   Neck:      Vascular: No JVD.      Trachea: No tracheal deviation.   Cardiovascular:       Rate and Rhythm: Normal rate and regular rhythm.      Heart sounds: No murmur heard.    No friction rub. No gallop.   Pulmonary:      Effort: Pulmonary effort is normal. No respiratory distress.      Breath sounds: Normal breath sounds. No stridor. No wheezing.   Chest:      Chest wall: No tenderness.   Abdominal:      General: Bowel sounds are normal. There is no distension.      Palpations: Abdomen is soft.      Tenderness: There is no abdominal tenderness. There is no rebound.   Musculoskeletal:         General: No tenderness.      Cervical back: Neck supple.   Skin:     General: Skin is warm and dry.      Nails: There is no clubbing.   Neurological:      Mental Status: He is alert and oriented to person, place, and time.      Cranial Nerves: No cranial nerve deficit.      Motor: No abnormal muscle tone.   Psychiatric:         Behavior: Behavior normal.       Fluids    Intake/Output Summary (Last 24 hours) at 4/15/2023 1337  Last data filed at 4/15/2023 0845  Gross per 24 hour   Intake 180 ml   Output --   Net 180 ml       Laboratory  Recent Labs     04/13/23  1612   WBC 13.1*   RBC 5.14   HEMOGLOBIN 15.8   HEMATOCRIT 47.3   MCV 92.0   MCH 30.7   MCHC 33.4*   RDW 42.6   PLATELETCT 285   MPV 9.6       Recent Labs     04/13/23  1612   SODIUM 142   POTASSIUM 4.0   CHLORIDE 107   CO2 24   GLUCOSE 89   BUN 12   CREATININE 0.66   CALCIUM 9.3                     Imaging  CT-CHEST (THORAX) WITH   Final Result      Negative CT scan of the chest with contrast.      Fleischner Society pulmonary nodule recommendations:   Not Applicable         MR-ORBITS, FACE, NECK-WITH   Final Result         Abnormal dural thickening and enhancement involving the right cavernous sinus, parasellar region, extending to the right tentorium. Differential diagnosis includes idiopathic inflammatory pseudotumor, meningioma, inflammatory processes such as Wegener's    granulomatosis, sarcoidosis, less likely lymphoma.      MR-BRAIN-WITH & W/O    Final Result         Abnormal dural thickening and enhancement involving the right cavernous sinus, parasellar region and extending to the right tentorium. Differential diagnosis includes idiopathic inflammatory pseudotumor, meningioma, inflammatory processes such as    Wegener's granulomatosis,, sarcoidosis, less likely lymphoma.      MR-MRA HEAD-W/O    (Results Pending)   MR-MRA NECK-W/O    (Results Pending)          Assessment/Plan  * Lateral rectus palsy- (present on admission)  Assessment & Plan  Etiology is not clear  Work-up so far negative  Discussed with Dr. 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 In review of yesterday's note (4/14/2023), there are no changes except as documented above.

## 2023-04-16 LAB
BURR CELLS/RBC NFR CSF MANUAL: 0 %
CLARITY CSF: CLEAR
COLOR CSF: COLORLESS
COLOR SPUN CSF: COLORLESS
CSF COMMENTS 1658: NORMAL
LYMPHOCYTES NFR CSF: 100 %
MYELOPEROXIDASE AB SER-ACNC: 0 AU/ML (ref 0–19)
NUCLEAR IGG SER QL IA: NORMAL
PROTEINASE3 AB SER-ACNC: 0 AU/ML (ref 0–19)
RBC # CSF: <1 CELLS/UL
SPECIMEN VOL CSF: 14 ML
TUBE # CSF: 3
TUBE # CSF: 3
WBC # CSF: <1 CELLS/UL (ref 0–10)

## 2023-04-16 PROCEDURE — 70544 MR ANGIOGRAPHY HEAD W/O DYE: CPT

## 2023-04-16 PROCEDURE — 700102 HCHG RX REV CODE 250 W/ 637 OVERRIDE(OP): Performed by: PSYCHIATRY & NEUROLOGY

## 2023-04-16 PROCEDURE — 99232 SBSQ HOSP IP/OBS MODERATE 35: CPT | Performed by: PSYCHIATRY & NEUROLOGY

## 2023-04-16 PROCEDURE — A9270 NON-COVERED ITEM OR SERVICE: HCPCS | Performed by: FAMILY MEDICINE

## 2023-04-16 PROCEDURE — 700102 HCHG RX REV CODE 250 W/ 637 OVERRIDE(OP): Performed by: FAMILY MEDICINE

## 2023-04-16 PROCEDURE — 99232 SBSQ HOSP IP/OBS MODERATE 35: CPT | Performed by: HOSPITALIST

## 2023-04-16 PROCEDURE — 770006 HCHG ROOM/CARE - MED/SURG/GYN SEMI*

## 2023-04-16 PROCEDURE — 700111 HCHG RX REV CODE 636 W/ 250 OVERRIDE (IP): Performed by: PSYCHIATRY & NEUROLOGY

## 2023-04-16 PROCEDURE — A9270 NON-COVERED ITEM OR SERVICE: HCPCS | Performed by: PSYCHIATRY & NEUROLOGY

## 2023-04-16 PROCEDURE — 70547 MR ANGIOGRAPHY NECK W/O DYE: CPT

## 2023-04-16 RX ORDER — OMEPRAZOLE 20 MG/1
20 CAPSULE, DELAYED RELEASE ORAL 2 TIMES DAILY
Status: DISCONTINUED | OUTPATIENT
Start: 2023-04-16 | End: 2023-04-18 | Stop reason: HOSPADM

## 2023-04-16 RX ORDER — PREDNISONE 20 MG/1
40 TABLET ORAL DAILY
Status: DISCONTINUED | OUTPATIENT
Start: 2023-04-25 | End: 2023-04-18 | Stop reason: HOSPADM

## 2023-04-16 RX ORDER — PREDNISONE 50 MG/1
100 TABLET ORAL DAILY
Status: COMPLETED | OUTPATIENT
Start: 2023-04-16 | End: 2023-04-18

## 2023-04-16 RX ORDER — PREDNISONE 20 MG/1
20 TABLET ORAL DAILY
Status: DISCONTINUED | OUTPATIENT
Start: 2023-04-28 | End: 2023-04-18 | Stop reason: HOSPADM

## 2023-04-16 RX ORDER — PREDNISONE 50 MG/1
100 TABLET ORAL DAILY
Status: DISCONTINUED | OUTPATIENT
Start: 2023-04-16 | End: 2023-04-16

## 2023-04-16 RX ADMIN — ACETAMINOPHEN 650 MG: 325 TABLET, FILM COATED ORAL at 17:08

## 2023-04-16 RX ADMIN — PREDNISONE 100 MG: 50 TABLET ORAL at 17:06

## 2023-04-16 RX ADMIN — DOCUSATE SODIUM 50 MG AND SENNOSIDES 8.6 MG 2 TABLET: 8.6; 5 TABLET, FILM COATED ORAL at 17:06

## 2023-04-16 RX ADMIN — OXYCODONE 2.5 MG: 5 TABLET ORAL at 09:27

## 2023-04-16 RX ADMIN — OXYCODONE 2.5 MG: 5 TABLET ORAL at 00:13

## 2023-04-16 RX ADMIN — OMEPRAZOLE 20 MG: 20 CAPSULE, DELAYED RELEASE ORAL at 17:05

## 2023-04-16 ASSESSMENT — PAIN DESCRIPTION - PAIN TYPE
TYPE: ACUTE PAIN

## 2023-04-16 ASSESSMENT — ENCOUNTER SYMPTOMS
SHORTNESS OF BREATH: 0
FEVER: 0
DOUBLE VISION: 1
EYE PAIN: 1

## 2023-04-16 NOTE — PROGRESS NOTES
Neurology Follow-up  Neurohospitalist Service, [facility]    Referring Physician: Flaquito Teixeira M.D.    Chief Complaint   Patient presents with    Eye Pain     Symptoms started 3 weeks ago. Right eye. Blurred vision when pt turns head to the right. Associated with sensation of pain/numbness surround the right eye.        Interval history: No acute events overnight, patient continues to have double vision and has not noticed any changes in his symptoms.  Patient family was curious about the results of the testing so far.    Review of systems: In addition to what is detailed in the HPI above, all other systems reviewed and are negative.    Past Medical History:    has a past medical history of Lateral rectus palsy.    FHx:  Family history is unknown by patient.    SHx:   reports that he has never smoked. He has never used smokeless tobacco. He reports that he does not currently use alcohol. He reports that he does not currently use drugs.    Allergies:  No Known Allergies    Medications:    Current Facility-Administered Medications:     senna-docusate (PERICOLACE or SENOKOT S) 8.6-50 MG per tablet 2 Tablet, 2 Tablet, Oral, BID, 2 Tablet at 04/14/23 0520 **AND** polyethylene glycol/lytes (MIRALAX) PACKET 1 Packet, 1 Packet, Oral, QDAY PRN **AND** magnesium hydroxide (MILK OF MAGNESIA) suspension 30 mL, 30 mL, Oral, QDAY PRN **AND** bisacodyl (DULCOLAX) suppository 10 mg, 10 mg, Rectal, QDAY PRN, Tono Frank M.D.    acetaminophen (Tylenol) tablet 650 mg, 650 mg, Oral, Q6HRS PRN, Tono Frank M.D., 650 mg at 04/15/23 1304    Notify provider if pain remains uncontrolled, , , CONTINUOUS **AND** Use the Numeric Rating Scale (NRS), Lucio-Baker Faces (WBF), or FLACC on regular floors and Critical-Care Pain Observation Tool (CPOT) on ICUs/Trauma to assess pain, , , CONTINUOUS **AND** Pulse Ox, , , CONTINUOUS **AND** Pharmacy Consult Request ...Pain Management Review 1 Each, 1 Each,  "Other, PHARMACY TO DOSE **AND** If patient difficult to arouse and/or has respiratory depression (respiratory rate of 10 or less), stop any opiates that are currently infusing and call a Rapid Response., , , CONTINUOUS, Tono Frank M.D.    oxyCODONE immediate-release (ROXICODONE) tablet 2.5 mg, 2.5 mg, Oral, Q3HRS PRN, 2.5 mg at 04/13/23 1741 **OR** oxyCODONE immediate-release (ROXICODONE) tablet 5 mg, 5 mg, Oral, Q3HRS PRN, 5 mg at 04/15/23 0933 **OR** morphine 4 MG/ML injection 2 mg, 2 mg, Intravenous, Q3HRS PRN, Tono Frank M.D.    ondansetron (ZOFRAN) syringe/vial injection 4 mg, 4 mg, Intravenous, Q4HRS PRN, Tono Frank M.D.    ondansetron (ZOFRAN ODT) dispertab 4 mg, 4 mg, Oral, Q4HRS PRN, Tono Frank M.D.    promethazine (PHENERGAN) tablet 12.5-25 mg, 12.5-25 mg, Oral, Q4HRS PRN, Tono Frank M.D.    promethazine (PHENERGAN) suppository 12.5-25 mg, 12.5-25 mg, Rectal, Q4HRS PRN, Tono Frank M.D.    prochlorperazine (COMPAZINE) injection 5-10 mg, 5-10 mg, Intravenous, Q4HRS PRN, Tono Frank M.D.    Physical Examination:     General: Patient is awake and in no acute distress    NEUROLOGICAL EXAM:     /77   Pulse 63   Temp 36.6 °C (97.9 °F) (Temporal)   Resp 17   Ht 1.778 m (5' 10\")   Wt 88.9 kg (195 lb 15.8 oz)   SpO2 98%   BMI 28.12 kg/m²       Mental status: Awake, alert and fully oriented  Speech and language: Speech is clear and fluent. The patient is able to name and repeat, and follow commands  Cranial nerve exam: Pupils are equal, round and reactive to light bilaterally. Visual fields are full. There is no nystagmus. Extraocular muscles are intact. Face is symmetric. Sensation in the face is intact to light touch. Palate elevates symmetrically. Tongue is midline.  Motor exam: There is sustained antigravity with no downward drift in bilateral arms and legs.  There is no pronator drift. Tone is normal. No abnormal movements were seen on exam.  Sensory " exam: Reacts to tactile in all 4 extremities, no neglect to double stim   Deep tendon reflexes:  2+ throughout. Toes down-going bilaterally.  Coordination: No dysmetria on bilateral finger-to-nose testing  Gait: Deferred due to patient preference    Objective Data:    Labs:  No results found for: PROTHROMBTM, INR   Lab Results   Component Value Date/Time    WBC 13.1 (H) 04/13/2023 04:12 PM    RBC 5.14 04/13/2023 04:12 PM    HEMOGLOBIN 15.8 04/13/2023 04:12 PM    HEMATOCRIT 47.3 04/13/2023 04:12 PM    MCV 92.0 04/13/2023 04:12 PM    MCH 30.7 04/13/2023 04:12 PM    MCHC 33.4 (L) 04/13/2023 04:12 PM    MPV 9.6 04/13/2023 04:12 PM    NEUTSPOLYS 69.80 04/13/2023 04:12 PM    LYMPHOCYTES 19.60 (L) 04/13/2023 04:12 PM    MONOCYTES 6.90 04/13/2023 04:12 PM    EOSINOPHILS 2.80 04/13/2023 04:12 PM    BASOPHILS 0.40 04/13/2023 04:12 PM      Lab Results   Component Value Date/Time    SODIUM 142 04/13/2023 04:12 PM    POTASSIUM 4.0 04/13/2023 04:12 PM    CHLORIDE 107 04/13/2023 04:12 PM    CO2 24 04/13/2023 04:12 PM    GLUCOSE 89 04/13/2023 04:12 PM    BUN 12 04/13/2023 04:12 PM    CREATININE 0.66 04/13/2023 04:12 PM    BUNCREATRAT 12.2 04/07/2023 07:45 PM      No results found for: CHOLSTRLTOT, LDL, HDL, TRIGLYCERIDE    Lab Results   Component Value Date/Time    ALKPHOSPHAT 120 (H) 04/13/2023 04:12 PM    ASTSGOT 15 04/13/2023 04:12 PM    ALTSGPT 23 04/13/2023 04:12 PM    TBILIRUBIN 0.5 04/13/2023 04:12 PM        Imaging/Testing:    I interpreted and/or reviewed the patient's neuroimaging    CT-CHEST (THORAX) WITH   Final Result      Negative CT scan of the chest with contrast.      Fleischner Society pulmonary nodule recommendations:   Not Applicable         MR-ORBITS, FACE, NECK-WITH   Final Result         Abnormal dural thickening and enhancement involving the right cavernous sinus, parasellar region, extending to the right tentorium. Differential diagnosis includes idiopathic inflammatory pseudotumor, meningioma, inflammatory  processes such as Wegener's    granulomatosis, sarcoidosis, less likely lymphoma.      MR-BRAIN-WITH & W/O   Final Result         Abnormal dural thickening and enhancement involving the right cavernous sinus, parasellar region and extending to the right tentorium. Differential diagnosis includes idiopathic inflammatory pseudotumor, meningioma, inflammatory processes such as    Wegener's granulomatosis,, sarcoidosis, less likely lymphoma.      MR-MRA HEAD-W/O    (Results Pending)   MR-MRA NECK-W/O    (Results Pending)       Impression and Recommendations:  Right 6th nerve palsy  Right facial numbness  Subtle right facial droop  Abnormal brain MRI     Patient is a young male with no medical comorbidities presenting with symptoms localizing to the 5th, 6th and 7th nerve on the right side with an abnormal lesion noted in the cavernous sinus on the right side.      Based on the clinical symptoms and the MRI findings there is a long list of differentials including potential vascular causes including aneurysm, fistula, thrombosis or dissection; infection including mucocele, abscess or periostitis, varicella-zoster, fungal infection including mucormycosis or actinomycosis, Treponema pallidum, tuberculosis; other inflammatory disorders including sarcoidosis, Wegener's granulomatosis.  Another rare possibility could be Marion-Hunt syndrome but that is a diagnosis of exclusion and we need to do a thorough work-up before we can confirm the diagnosis.    Negative work-up so far-  CT chest with contrast showed no evidence of any lymphadenopathy  CSF showed normal protein and glucose levels  His TSH was normal  HIV was negative  Normal CRP and ESR    I am planning to wait for the MRI head and neck and cell count in the CSF along with VZV/HSV PCR to be negative before starting him on steroids.  Marion-Cuellar syndrome is a rare diagnosis and we need to rule out other etiologies.     Recommendations:  -Please get MRA head and  neck  -Awaiting labs for vasculitis including FLACA, RAMÍREZ, ANCA  -Awaiting CSF for testing including cell count, bacterial culture, viral culture, VZV PCR, ACE levels, VDRL, CSF cultures for TB  -Awaiting QuantiFERON test for tuberculosis  -Tylenol as needed for headache  -Patient may benefit from a eye patch       Thank you for letting me participate in the care for this patient.  Neurology will continue to follow.    Dain Carrera MD  Board certified in Neurology and Epilepsy (ABPN)   Board certified in Headache Medicine ([facility])  Neurohospitalist, Acute Care Services

## 2023-04-16 NOTE — CARE PLAN
The patient is Stable - Low risk of patient condition declining or worsening    Shift Goals  Clinical Goals: pain management, safety  Patient Goals: pain management  Family Goals: KEISHA    Progress made toward(s) clinical / shift goals:    Problem: Pain - Standard  Goal: Alleviation of pain or a reduction in pain to the patient’s comfort goal  Outcome: Progressing     Problem: Fall Risk  Goal: Patient will remain free from falls  Outcome: Progressing       Patient is not progressing towards the following goals:

## 2023-04-16 NOTE — PROGRESS NOTES
Neurology Follow-up  Neurohospitalist Service, [facility]    Referring Physician: Flaquito Teixeira M.D.    Chief Complaint   Patient presents with    Eye Pain     Symptoms started 3 weeks ago. Right eye. Blurred vision when pt turns head to the right. Associated with sensation of pain/numbness surround the right eye.        Interval history: No acute events overnight, patient continues to have double vision and has not noticed any changes in his symptoms.  Patient family including dad and mom were at bedside today     Review of systems: In addition to what is detailed in the HPI above, all other systems reviewed and are negative.    Past Medical History:    has a past medical history of Lateral rectus palsy.    FHx:  Family history is unknown by patient.    SHx:   reports that he has never smoked. He has never used smokeless tobacco. He reports that he does not currently use alcohol. He reports that he does not currently use drugs.    Allergies:  No Known Allergies    Medications:    Current Facility-Administered Medications:     omeprazole (PRILOSEC) capsule 20 mg, 20 mg, Oral, BID, Dain Carrera M.D.    predniSONE (DELTASONE) tablet 100 mg, 100 mg, Oral, DAILY **FOLLOWED BY** [START ON 4/19/2023] predniSONE (DELTASONE) tablet 80 mg, 80 mg, Oral, DAILY **FOLLOWED BY** [START ON 4/22/2023] predniSONE (DELTASONE) tablet 60 mg, 60 mg, Oral, DAILY **FOLLOWED BY** [START ON 4/25/2023] predniSONE (DELTASONE) tablet 40 mg, 40 mg, Oral, DAILY **FOLLOWED BY** [START ON 4/28/2023] predniSONE (DELTASONE) tablet 20 mg, 20 mg, Oral, DAILY, Dain Carrera M.D.    senna-docusate (PERICOLACE or SENOKOT S) 8.6-50 MG per tablet 2 Tablet, 2 Tablet, Oral, BID, 2 Tablet at 04/14/23 0520 **AND** polyethylene glycol/lytes (MIRALAX) PACKET 1 Packet, 1 Packet, Oral, QDAY PRN **AND** magnesium hydroxide (MILK OF MAGNESIA) suspension 30 mL, 30 mL, Oral, QDAY PRN **AND** bisacodyl (DULCOLAX) suppository 10 mg, 10  "mg, Rectal, QDAY PRN, Tono Frank M.D.    acetaminophen (Tylenol) tablet 650 mg, 650 mg, Oral, Q6HRS PRN, Tono Frank M.D., 650 mg at 04/15/23 1304    Notify provider if pain remains uncontrolled, , , CONTINUOUS **AND** Use the Numeric Rating Scale (NRS), Lucio-Baker Faces (WBF), or FLACC on regular floors and Critical-Care Pain Observation Tool (CPOT) on ICUs/Trauma to assess pain, , , CONTINUOUS **AND** Pulse Ox, , , CONTINUOUS **AND** Pharmacy Consult Request ...Pain Management Review 1 Each, 1 Each, Other, PHARMACY TO DOSE **AND** If patient difficult to arouse and/or has respiratory depression (respiratory rate of 10 or less), stop any opiates that are currently infusing and call a Rapid Response., , , CONTINUOUS, Tono Frank M.D.    oxyCODONE immediate-release (ROXICODONE) tablet 2.5 mg, 2.5 mg, Oral, Q3HRS PRN, 2.5 mg at 04/16/23 0927 **OR** oxyCODONE immediate-release (ROXICODONE) tablet 5 mg, 5 mg, Oral, Q3HRS PRN, 5 mg at 04/15/23 0933 **OR** morphine 4 MG/ML injection 2 mg, 2 mg, Intravenous, Q3HRS PRN, Tono Frank M.D.    ondansetron (ZOFRAN) syringe/vial injection 4 mg, 4 mg, Intravenous, Q4HRS PRN, Tono Frank M.D.    ondansetron (ZOFRAN ODT) dispertab 4 mg, 4 mg, Oral, Q4HRS PRN, Tono Frank M.D.    promethazine (PHENERGAN) tablet 12.5-25 mg, 12.5-25 mg, Oral, Q4HRS PRN, Tono Frank M.D.    promethazine (PHENERGAN) suppository 12.5-25 mg, 12.5-25 mg, Rectal, Q4HRS PRN, Tono [place], M.D.    prochlorperazine (COMPAZINE) injection 5-10 mg, 5-10 mg, Intravenous, Q4HRS PRN, Tono Frank M.D.    Physical Examination:     General: Patient is awake and in no acute distress    NEUROLOGICAL EXAM:     /72   Pulse 64   Temp 35.8 °C (96.5 °F) (Temporal)   Resp 16   Ht 1.778 m (5' 10\")   Wt 88.9 kg (195 lb 15.8 oz)   SpO2 98%   BMI 28.12 kg/m²     Mental status: Awake, alert and fully oriented  Speech and language: Speech is clear and fluent. The " patient is able to name and repeat, and follow commands  Cranial nerve exam: Pupils are equal, round and reactive to light bilaterally. Visual fields are full. There is no nystagmus. Extraocular muscles are intact. Face is symmetric. Sensation in the face is intact to light touch. Palate elevates symmetrically. Tongue is midline.  Motor exam: There is sustained antigravity with no downward drift in bilateral arms and legs.  There is no pronator drift. Tone is normal. No abnormal movements were seen on exam.  Sensory exam: Reacts to tactile in all 4 extremities, no neglect to double stim   Deep tendon reflexes:  2+ throughout. Toes down-going bilaterally.  Coordination: No dysmetria on bilateral finger-to-nose testing  Gait: Deferred due to patient preference    Objective Data:    Labs:  No results found for: PROTHROMBTM, INR   Lab Results   Component Value Date/Time    WBC 13.1 (H) 04/13/2023 04:12 PM    RBC 5.14 04/13/2023 04:12 PM    HEMOGLOBIN 15.8 04/13/2023 04:12 PM    HEMATOCRIT 47.3 04/13/2023 04:12 PM    MCV 92.0 04/13/2023 04:12 PM    MCH 30.7 04/13/2023 04:12 PM    MCHC 33.4 (L) 04/13/2023 04:12 PM    MPV 9.6 04/13/2023 04:12 PM    NEUTSPOLYS 69.80 04/13/2023 04:12 PM    LYMPHOCYTES 19.60 (L) 04/13/2023 04:12 PM    MONOCYTES 6.90 04/13/2023 04:12 PM    EOSINOPHILS 2.80 04/13/2023 04:12 PM    BASOPHILS 0.40 04/13/2023 04:12 PM      Lab Results   Component Value Date/Time    SODIUM 142 04/13/2023 04:12 PM    POTASSIUM 4.0 04/13/2023 04:12 PM    CHLORIDE 107 04/13/2023 04:12 PM    CO2 24 04/13/2023 04:12 PM    GLUCOSE 89 04/13/2023 04:12 PM    BUN 12 04/13/2023 04:12 PM    CREATININE 0.66 04/13/2023 04:12 PM    BUNCREATRAT 12.2 04/07/2023 07:45 PM      No results found for: CHOLSTRLTOT, LDL, HDL, TRIGLYCERIDE    Lab Results   Component Value Date/Time    ALKPHOSPHAT 120 (H) 04/13/2023 04:12 PM    ASTSGOT 15 04/13/2023 04:12 PM    ALTSGPT 23 04/13/2023 04:12 PM    TBILIRUBIN 0.5 04/13/2023 04:12 PM         Imaging/Testing:    I interpreted and/or reviewed the patient's neuroimaging    MR-MRA NECK-W/O   Final Result      1. MRA OF THE CERVICAL VERTEBRAL AND CAROTID ARTERIES WITHIN NORMAL LIMITS WITH NO EVIDENCE OF FLOW LIMITING LESION.      MR-MRA HEAD-W/O   Final Result      1.  Unremarkable MRA [place]. No evidence of aneurysm or intracranial occlusive, stenotic, or vasculitic disease of medium or large vessels.   2.  Abnormal intermediate T2 signal occupying the right cavernous sinus and right parasellar region concordant with dural based meningeal enhancement and thickening seen on MRI brain and orbit studies from 4/14/2023. See earlier reports.      CT-CHEST (THORAX) WITH   Final Result      Negative CT scan of the chest with contrast.      Fleischner Society pulmonary nodule recommendations:   Not Applicable         MR-ORBITS, FACE, NECK-WITH   Final Result         Abnormal dural thickening and enhancement involving the right cavernous sinus, parasellar region, extending to the right tentorium. Differential diagnosis includes idiopathic inflammatory pseudotumor, meningioma, inflammatory processes such as Wegener's    granulomatosis, sarcoidosis, less likely lymphoma.      MR-BRAIN-WITH & W/O   Final Result         Abnormal dural thickening and enhancement involving the right cavernous sinus, parasellar region and extending to the right tentorium. Differential diagnosis includes idiopathic inflammatory pseudotumor, meningioma, inflammatory processes such as    Wegener's granulomatosis,, sarcoidosis, less likely lymphoma.          Impression and Recommendations:  Right 6th nerve palsy  Right facial numbness  Subtle right facial droop  Abnormal brain MRI     Patient is a young male with no medical comorbidities presenting with symptoms localizing to the 5th, 6th and 7th nerve on the right side with an abnormal lesion noted in the cavernous sinus on the right side.       Based on the clinical symptoms  and the MRI findings there is a long list of differentials including potential vascular causes including aneurysm, fistula, thrombosis or dissection; infection including mucocele, abscess or periostitis, varicella-zoster, fungal infection including mucormycosis or actinomycosis, Treponema pallidum, tuberculosis; other inflammatory disorders including sarcoidosis, Wegener's granulomatosis.  Another rare possibility could be Marion-Hunt syndrome but that is a diagnosis of exclusion and we need to do a thorough work-up before we can confirm the diagnosis.     Negative work-up so far-  CT chest with contrast showed no evidence of any lymphadenopathy  CSF showed normal protein and glucose levels  His TSH was normal  HIV was negative  Normal CRP and ESR  MRA head and neck was normal  CSF cell count was also normal  CSF Gram stain and AFB stain was negative     Overall based on the negative work-up so far, we have ruled out most of the life-threatening, scary diagnoses including aneurysm, fistulas, thrombosis, dissection; infection-mostly bacterial, fungal and TB, VZV infections.      The diagnosis left on the differential now include mostly inflammatory diseases including vasculitis, sarcoidosis and Marion Hunt syndrome.  Overall I will plan to start him on steroid which should help with all these different inflammatory disease process and help patient recover some of his function, he has been suffering for almost 2 weeks now.     Recommendations:  -Please start patient on prednisone 100 mg daily for 3 days and then reduce the dose to 80 mg daily for 3 days and then reduce the dose to 60 mg daily for 3 days and then 40 mg daily for 3 days and then 20 mg daily for 7 days  -Please start patient also on omeprazole 20 mg twice daily while he is on the steroid treatment  -Awaiting labs for vasculitis including FLACA, RAMÍREZ, ANCA  -Awaiting CSF for testing including bacterial culture,  VZV PCR, ACE levels, VDRL, CSF cultures for  TB  -Awaiting QuantiFERON test for tuberculosis  -Tylenol as needed for headache  -Patient may benefit from a eye patch    Thank you for letting me participate in the care for this patient.  Neurology will continue to follow.    Dain Carrera MD  Board certified in Neurology and Epilepsy ([employer])   Board certified in Headache Medicine ([facility])  Neurohospitalist, Acute Care Services

## 2023-04-16 NOTE — HOSPITAL COURSE
Neurology Follow-up  Neurohospitalist Service, [facility]    Referring Physician: Flaquito Teixeira M.D.    Chief Complaint   Patient presents with    Eye Pain     Symptoms started 3 weeks ago. Right eye. Blurred vision when pt turns head to the right. Associated with sensation of pain/numbness surround the right eye.        Interval history:     Review of systems: In addition to what is detailed in the HPI above, all other systems reviewed and are negative.    Past Medical History:    has a past medical history of Lateral rectus palsy.    FHx:  Family history is unknown by patient.    SHx:   reports that he has never smoked. He has never used smokeless tobacco. He reports that he does not currently use alcohol. He reports that he does not currently use drugs.    Allergies:  No Known Allergies    Medications:    Current Facility-Administered Medications:     predniSONE (DELTASONE) tablet 100 mg, 100 mg, Oral, DAILY, Dain Carrera M.D.    omeprazole (PRILOSEC) capsule 20 mg, 20 mg, Oral, BID, Dain Carrera M.D.    senna-docusate (PERICOLACE or SENOKOT S) 8.6-50 MG per tablet 2 Tablet, 2 Tablet, Oral, BID, 2 Tablet at 04/14/23 0520 **AND** polyethylene glycol/lytes (MIRALAX) PACKET 1 Packet, 1 Packet, Oral, QDAY PRN **AND** magnesium hydroxide (MILK OF MAGNESIA) suspension 30 mL, 30 mL, Oral, QDAY PRN **AND** bisacodyl (DULCOLAX) suppository 10 mg, 10 mg, Rectal, QDAY PRN, Tono Frank M.D.    acetaminophen (Tylenol) tablet 650 mg, 650 mg, Oral, Q6HRS PRN, Tono Frank M.D., 650 mg at 04/15/23 1304    Notify provider if pain remains uncontrolled, , , CONTINUOUS **AND** Use the Numeric Rating Scale (NRS), Lucio-Baker Faces (WBF), or FLACC on regular floors and Critical-Care Pain Observation Tool (CPOT) on ICUs/Trauma to assess pain, , , CONTINUOUS **AND** Pulse Ox, , , CONTINUOUS **AND** Pharmacy Consult Request ...Pain Management Review 1 Each, 1 Each, Other, PHARMACY TO DOSE  "**AND** If patient difficult to arouse and/or has respiratory depression (respiratory rate of 10 or less), stop any opiates that are currently infusing and call a Rapid Response., , , CONTINUOUS, Tono Frank M.D.    oxyCODONE immediate-release (ROXICODONE) tablet 2.5 mg, 2.5 mg, Oral, Q3HRS PRN, 2.5 mg at 04/16/23 0927 **OR** oxyCODONE immediate-release (ROXICODONE) tablet 5 mg, 5 mg, Oral, Q3HRS PRN, 5 mg at 04/15/23 0933 **OR** morphine 4 MG/ML injection 2 mg, 2 mg, Intravenous, Q3HRS PRN, Tono Frank M.D.    ondansetron (ZOFRAN) syringe/vial injection 4 mg, 4 mg, Intravenous, Q4HRS PRN, Tono Frank M.D.    ondansetron (ZOFRAN ODT) dispertab 4 mg, 4 mg, Oral, Q4HRS PRN, Tono Frank M.D.    promethazine (PHENERGAN) tablet 12.5-25 mg, 12.5-25 mg, Oral, Q4HRS PRN, Tono Frank M.D.    promethazine (PHENERGAN) suppository 12.5-25 mg, 12.5-25 mg, Rectal, Q4HRS PRN, Tono Frank M.D.    prochlorperazine (COMPAZINE) injection 5-10 mg, 5-10 mg, Intravenous, Q4HRS PRN, Tono Frank M.D.    Physical Examination:     General: Patient is awake and in no acute distress  Eye: Examination of optic disks not indicated at this time given acuity of consult  Neck: There is normal range of motion  CV: regular rate   Extremities:  clear, dry, intact, without peripheral edema    NEUROLOGICAL EXAM:     /72   Pulse 64   Temp 35.8 °C (96.5 °F) (Temporal)   Resp 16   Ht 1.778 m (5' 10\")   Wt 88.9 kg (195 lb 15.8 oz)   SpO2 98%   BMI 28.12 kg/m²       Mental status: Awake, alert and fully oriented  Speech and language: Speech is clear and fluent. The patient is able to name and repeat, and follow commands  Cranial nerve exam: Pupils are equal, round and reactive to light bilaterally. Visual fields are full. There is no nystagmus. Extraocular muscles are intact. Face is symmetric. Sensation in the face is intact to light touch. Palate elevates symmetrically. Tongue is midline.  Motor exam: " There is sustained antigravity with no downward drift in bilateral arms and legs.  There is no pronator drift. Tone is normal. No abnormal movements were seen on exam.  Sensory exam: Reacts to tactile in all 4 extremities, no neglect to double stim   Deep tendon reflexes:  2+ throughout. Toes down-going bilaterally.  Coordination: No dysmetria on bilateral finger-to-nose testing  Gait: Deferred due to patient preference        Objective Data:    Labs:  No results found for: PROTHROMBTM, INR   Lab Results   Component Value Date/Time    WBC 13.1 (H) 04/13/2023 04:12 PM    RBC 5.14 04/13/2023 04:12 PM    HEMOGLOBIN 15.8 04/13/2023 04:12 PM    HEMATOCRIT 47.3 04/13/2023 04:12 PM    MCV 92.0 04/13/2023 04:12 PM    MCH 30.7 04/13/2023 04:12 PM    MCHC 33.4 (L) 04/13/2023 04:12 PM    MPV 9.6 04/13/2023 04:12 PM    NEUTSPOLYS 69.80 04/13/2023 04:12 PM    LYMPHOCYTES 19.60 (L) 04/13/2023 04:12 PM    MONOCYTES 6.90 04/13/2023 04:12 PM    EOSINOPHILS 2.80 04/13/2023 04:12 PM    BASOPHILS 0.40 04/13/2023 04:12 PM      Lab Results   Component Value Date/Time    SODIUM 142 04/13/2023 04:12 PM    POTASSIUM 4.0 04/13/2023 04:12 PM    CHLORIDE 107 04/13/2023 04:12 PM    CO2 24 04/13/2023 04:12 PM    GLUCOSE 89 04/13/2023 04:12 PM    BUN 12 04/13/2023 04:12 PM    CREATININE 0.66 04/13/2023 04:12 PM    BUNCREATRAT 12.2 04/07/2023 07:45 PM      No results found for: CHOLSTRLTOT, LDL, HDL, TRIGLYCERIDE    Lab Results   Component Value Date/Time    ALKPHOSPHAT 120 (H) 04/13/2023 04:12 PM    ASTSGOT 15 04/13/2023 04:12 PM    ALTSGPT 23 04/13/2023 04:12 PM    TBILIRUBIN 0.5 04/13/2023 04:12 PM        Imaging/Testing:    I interpreted and/or reviewed the patient's neuroimaging    MR-MRA NECK-W/O   Final Result      1. MRA OF THE CERVICAL VERTEBRAL AND CAROTID ARTERIES WITHIN NORMAL LIMITS WITH NO EVIDENCE OF FLOW LIMITING LESION.      MR-MRA HEAD-W/O   Final Result      1.  Unremarkable MRA [place]. No evidence of aneurysm or  intracranial occlusive, stenotic, or vasculitic disease of medium or large vessels.   2.  Abnormal intermediate T2 signal occupying the right cavernous sinus and right parasellar region concordant with dural based meningeal enhancement and thickening seen on MRI brain and orbit studies from 4/14/2023. See earlier reports.      CT-CHEST (THORAX) WITH   Final Result      Negative CT scan of the chest with contrast.      Fleischner Society pulmonary nodule recommendations:   Not Applicable         MR-ORBITS, FACE, NECK-WITH   Final Result         Abnormal dural thickening and enhancement involving the right cavernous sinus, parasellar region, extending to the right tentorium. Differential diagnosis includes idiopathic inflammatory pseudotumor, meningioma, inflammatory processes such as Wegener's    granulomatosis, sarcoidosis, less likely lymphoma.      MR-BRAIN-WITH & W/O   Final Result         Abnormal dural thickening and enhancement involving the right cavernous sinus, parasellar region and extending to the right tentorium. Differential diagnosis includes idiopathic inflammatory pseudotumor, meningioma, inflammatory processes such as    Wegener's granulomatosis,, sarcoidosis, less likely lymphoma.          Impression and Recommendations:      Thank you for letting me participate in the care for this patient.  Neurology will continue to follow.    Dain Carrera MD  Board certified in Neurology and Epilepsy (ABPN)   Board certified in Headache Medicine (UCNS)  Neurohospitalist, Acute Care Services

## 2023-04-16 NOTE — PROGRESS NOTES
Hospital Medicine Daily Progress Note    Date of Service  4/16/2023    Chief Complaint  Lopez Engle is a 18 y.o. male admitted 4/13/2023 with Diplopia    Hospital Course  Admitted with diplopia. He was noted to have probable right lateral rectus palsy.    Interval Problem Update    Diplopia improved still having mild right eye pain  No weakness or numbness  Reviewed MRA neck and head no changes of vasculitis  Reviewed CSF cell count total WBC less than 1  CSF cultures remain negative  QuantiFERON FLACA ANCA pending  Reviewed test results and discussed plan of care with patient with assistance of certified  via videoconference    I have discussed this patient's plan of care and discharge plan at IDT rounds today with Case Management, Nursing, Nursing leadership, and other members of the IDT team.    Consultants/Specialty  neurology    Code Status  Full Code    Disposition  Patient is not medically cleared for discharge.   Anticipate discharge to to home with close outpatient follow-up.  I have placed the appropriate orders for post-discharge needs.    Review of Systems  Review of Systems   Constitutional:  Negative for fever.   Eyes:  Positive for double vision and pain.   Respiratory:  Negative for shortness of breath.    Cardiovascular:  Negative for chest pain.   All other systems reviewed and are negative.     Physical Exam  Temp:  [35.8 °C (96.5 °F)-36.8 °C (98.3 °F)] 35.8 °C (96.5 °F)  Pulse:  [60-64] 64  Resp:  [16-18] 16  BP: (107-130)/(62-77) 124/72  SpO2:  [94 %-98 %] 98 %    Physical Exam  Vitals and nursing note reviewed.   Constitutional:       Appearance: He is well-developed. He is not diaphoretic.   HENT:      Head: Normocephalic and atraumatic.      Mouth/Throat:      Pharynx: No oropharyngeal exudate.   Eyes:      General: No scleral icterus.        Right eye: No discharge.         Left eye: No discharge.      Conjunctiva/sclera: Conjunctivae normal.      Pupils: Pupils are  equal, round, and reactive to light.      Comments: Right cranial nerve VI palsy   Neck:      Vascular: No JVD.      Trachea: No tracheal deviation.   Cardiovascular:      Rate and Rhythm: Normal rate and regular rhythm.      Heart sounds: No murmur heard.    No friction rub. No gallop.   Pulmonary:      Effort: Pulmonary effort is normal. No respiratory distress.      Breath sounds: Normal breath sounds. No stridor. No wheezing.   Chest:      Chest wall: No tenderness.   Abdominal:      General: Bowel sounds are normal. There is no distension.      Palpations: Abdomen is soft.      Tenderness: There is no abdominal tenderness. There is no rebound.   Musculoskeletal:         General: No tenderness.      Cervical back: Neck supple.   Skin:     General: Skin is warm and dry.      Nails: There is no clubbing.   Neurological:      Mental Status: He is alert and oriented to person, place, and time.      Cranial Nerves: No cranial nerve deficit.      Motor: No abnormal muscle tone.   Psychiatric:         Behavior: Behavior normal.       Fluids    Intake/Output Summary (Last 24 hours) at 4/16/2023 1351  Last data filed at 4/16/2023 0845  Gross per 24 hour   Intake 240 ml   Output --   Net 240 ml         Laboratory  Recent Labs     04/13/23  1612   WBC 13.1*   RBC 5.14   HEMOGLOBIN 15.8   HEMATOCRIT 47.3   MCV 92.0   MCH 30.7   MCHC 33.4*   RDW 42.6   PLATELETCT 285   MPV 9.6       Recent Labs     04/13/23  1612   SODIUM 142   POTASSIUM 4.0   CHLORIDE 107   CO2 24   GLUCOSE 89   BUN 12   CREATININE 0.66   CALCIUM 9.3                     Imaging  MR-MRA NECK-W/O   Final Result      1. MRA OF THE CERVICAL VERTEBRAL AND CAROTID ARTERIES WITHIN NORMAL LIMITS WITH NO EVIDENCE OF FLOW LIMITING LESION.      MR-MRA HEAD-W/O   Final Result      1.  Unremarkable MRA [place]. No evidence of aneurysm or intracranial occlusive, stenotic, or vasculitic disease of medium or large vessels.   2.  Abnormal intermediate T2 signal  occupying the right cavernous sinus and right parasellar region concordant with dural based meningeal enhancement and thickening seen on MRI brain and orbit studies from 4/14/2023. See earlier reports.      CT-CHEST (THORAX) WITH   Final Result      Negative CT scan of the chest with contrast.      Fleischner Society pulmonary nodule recommendations:   Not Applicable         MR-ORBITS, FACE, NECK-WITH   Final Result         Abnormal dural thickening and enhancement involving the right cavernous sinus, parasellar region, extending to the right tentorium. Differential diagnosis includes idiopathic inflammatory pseudotumor, meningioma, inflammatory processes such as Wegener's    granulomatosis, sarcoidosis, less likely lymphoma.      MR-BRAIN-WITH & W/O   Final Result         Abnormal dural thickening and enhancement involving the right cavernous sinus, parasellar region and extending to the right tentorium. Differential diagnosis includes idiopathic inflammatory pseudotumor, meningioma, inflammatory processes such as    Wegener's granulomatosis,, sarcoidosis, less likely lymphoma.             Assessment/Plan  * Lateral rectus palsy- (present on admission)  Assessment & Plan  Etiology is not clear  ? Marion-Hunt syndrome  Work-up so far negative  Neurology following follow-up on pending QuantiFERON and autoimmune serology if negative consider empiric treatment with prednisone         VTE prophylaxis: SCDs/TEDs    I have performed a physical exam and reviewed and updated ROS and Plan today (4/16/2023). In review of yesterday's note (4/15/2023), there are no changes except as documented above.

## 2023-04-16 NOTE — CARE PLAN
The patient is Stable - Low risk of patient condition declining or worsening    Shift Goals  Clinical Goals: Safety, pain management  Patient Goals: Pain control  Family Goals: KEISHA    Progress made toward(s) clinical / shift goals:    Problem: Fall Risk  Goal: Patient will remain free from falls  Description: Target End Date:  Prior to discharge or change in level of care    Document interventions on the [place] Fall Risk Assessment    1.  Assess for fall risk factors  2.  Implement fall precautions  Outcome: Progressing     Problem: Knowledge Deficit - Standard  Goal: Patient and family/care givers will demonstrate understanding of plan of care, disease process/condition, diagnostic tests and medications  Description: Target End Date:  1-3 days or as soon as patient condition allows    Document in Patient Education    1.  Patient and family/caregiver oriented to unit, equipment, visitation policy and means for communicating concern  2.  Complete/review Learning Assessment  3.  Assess knowledge level of disease process/condition, treatment plan, diagnostic tests and medications  4.  Explain disease process/condition, treatment plan, diagnostic tests and medications  Outcome: Progressing       Patient is not progressing towards the following goals:

## 2023-04-16 NOTE — PROGRESS NOTES
4 Eyes Skin Assessment Completed by ZENY Flores and ZENY Paniagua.    Head WDL, slight swelling under right eye  Ears WDL  Nose WDL  Mouth WDL  Neck WDL  Breast/Chest WDL  Shoulder Blades WDL  Spine WDL, LP site   (R) Arm/Elbow/Hand WDL  (L) Arm/Elbow/Hand WDL  Abdomen WDL  Groin WDL  Scrotum/Coccyx/Buttocks WDL  (R) Leg WDL  (L) Leg WDLbirthmarks  (R) Heel/Foot/Toe WDL  (L) Heel/Foot/Toe WDL          Devices In Places Pulse Ox      Interventions In Place Pillows and Pressure Redistribution Mattress    Possible Skin Injury No    Pictures Uploaded Into Epic N/A  Wound Consult Placed N/A  RN Wound Prevention Protocol Ordered No

## 2023-04-17 LAB
ACE CSF-CCNC: 0.9 U/L (ref 0–2.5)
BACTERIA CSF CULT: NORMAL
GAMMA INTERFERON BACKGROUND BLD IA-ACNC: 0.05 IU/ML
GRAM STN SPEC: NORMAL
M TB IFN-G BLD-IMP: NEGATIVE
M TB IFN-G CD4+ BCKGRND COR BLD-ACNC: 0.11 IU/ML
MITOGEN IGNF BCKGRD COR BLD-ACNC: >10 IU/ML
QFT TB2 - NIL TBQ2: 0.1 IU/ML
SIGNIFICANT IND 70042: NORMAL
SITE SITE: NORMAL
SOURCE SOURCE: NORMAL

## 2023-04-17 PROCEDURE — 700102 HCHG RX REV CODE 250 W/ 637 OVERRIDE(OP): Performed by: PSYCHIATRY & NEUROLOGY

## 2023-04-17 PROCEDURE — 700111 HCHG RX REV CODE 636 W/ 250 OVERRIDE (IP): Performed by: PSYCHIATRY & NEUROLOGY

## 2023-04-17 PROCEDURE — 700102 HCHG RX REV CODE 250 W/ 637 OVERRIDE(OP): Performed by: FAMILY MEDICINE

## 2023-04-17 PROCEDURE — 700102 HCHG RX REV CODE 250 W/ 637 OVERRIDE(OP): Performed by: HOSPITALIST

## 2023-04-17 PROCEDURE — 770006 HCHG ROOM/CARE - MED/SURG/GYN SEMI*

## 2023-04-17 PROCEDURE — 99232 SBSQ HOSP IP/OBS MODERATE 35: CPT | Performed by: HOSPITALIST

## 2023-04-17 PROCEDURE — 99232 SBSQ HOSP IP/OBS MODERATE 35: CPT | Performed by: PSYCHIATRY & NEUROLOGY

## 2023-04-17 PROCEDURE — A9270 NON-COVERED ITEM OR SERVICE: HCPCS | Performed by: HOSPITALIST

## 2023-04-17 PROCEDURE — A9270 NON-COVERED ITEM OR SERVICE: HCPCS | Performed by: FAMILY MEDICINE

## 2023-04-17 PROCEDURE — A9270 NON-COVERED ITEM OR SERVICE: HCPCS | Performed by: PSYCHIATRY & NEUROLOGY

## 2023-04-17 RX ORDER — SULFAMETHOXAZOLE AND TRIMETHOPRIM 800; 160 MG/1; MG/1
1 TABLET ORAL
Status: DISCONTINUED | OUTPATIENT
Start: 2023-04-17 | End: 2023-04-18 | Stop reason: HOSPADM

## 2023-04-17 RX ADMIN — DOCUSATE SODIUM 50 MG AND SENNOSIDES 8.6 MG 2 TABLET: 8.6; 5 TABLET, FILM COATED ORAL at 04:29

## 2023-04-17 RX ADMIN — DOCUSATE SODIUM 50 MG AND SENNOSIDES 8.6 MG 2 TABLET: 8.6; 5 TABLET, FILM COATED ORAL at 17:08

## 2023-04-17 RX ADMIN — SULFAMETHOXAZOLE AND TRIMETHOPRIM 1 TABLET: 800; 160 TABLET ORAL at 17:08

## 2023-04-17 RX ADMIN — OMEPRAZOLE 20 MG: 20 CAPSULE, DELAYED RELEASE ORAL at 17:08

## 2023-04-17 RX ADMIN — PREDNISONE 100 MG: 50 TABLET ORAL at 04:29

## 2023-04-17 RX ADMIN — OMEPRAZOLE 20 MG: 20 CAPSULE, DELAYED RELEASE ORAL at 04:30

## 2023-04-17 ASSESSMENT — ENCOUNTER SYMPTOMS
FEVER: 0
EYE PAIN: 0
HEADACHES: 1
DOUBLE VISION: 0

## 2023-04-17 ASSESSMENT — PAIN DESCRIPTION - PAIN TYPE: TYPE: ACUTE PAIN

## 2023-04-17 NOTE — CARE PLAN
The patient is Stable - Low risk of patient condition declining or worsening    Shift Goals  Clinical Goals: pain mangaement  Patient Goals: pain control  Family Goals: KEISHA    Progress made toward(s) clinical / shift goals:    Problem: Pain - Standard  Goal: Alleviation of pain or a reduction in pain to the patient’s comfort goal  Outcome: Progressing     Problem: Fall Risk  Goal: Patient will remain free from falls  Outcome: Progressing       Patient is not progressing towards the following goals:

## 2023-04-17 NOTE — CARE PLAN
The patient is Stable - Low risk of patient condition declining or worsening    Shift Goals  Clinical Goals: pain mangaement  Patient Goals: pain control  Family Goals: KEISHA    Progress made toward(s) clinical / shift goals:  Patient states his eye movement on the right side is slowly improving. Decrease in eye pain.

## 2023-04-17 NOTE — PROGRESS NOTES
Neurology Follow-up  Neurohospitalist Service, [facility]    Referring Physician: Flaquito Teixeira M.D.    Chief Complaint   Patient presents with    Eye Pain     Symptoms started 3 weeks ago. Right eye. Blurred vision when pt turns head to the right. Associated with sensation of pain/numbness surround the right eye.        Interval history: History obtained from the patient translated through unit CNA (Faroese-speaking).  No acute events reported.  Patient reports that his pain has resolved.  Has a mild nondisabling headache that is 2-3 out of 10 today.  Denies any new neurologic symptoms.  Has very mild blurring of the vision but denies any double vision.     Review of systems: In addition to what is detailed in the HPI above, all other systems reviewed and are negative.    Past Medical History:    has a past medical history of Lateral rectus palsy.    FHx:  Family history is unknown by patient.    SHx:   reports that he has never smoked. He has never used smokeless tobacco. He reports that he does not currently use alcohol. He reports that he does not currently use drugs.    Allergies:  No Known Allergies    Medications:    Current Facility-Administered Medications:     omeprazole (PRILOSEC) capsule 20 mg, 20 mg, Oral, BID, Dain Carrera M.D., 20 mg at 04/17/23 0430    predniSONE (DELTASONE) tablet 100 mg, 100 mg, Oral, DAILY, 100 mg at 04/17/23 0429 **FOLLOWED BY** [START ON 4/19/2023] predniSONE (DELTASONE) tablet 80 mg, 80 mg, Oral, DAILY **FOLLOWED BY** [START ON 4/22/2023] predniSONE (DELTASONE) tablet 60 mg, 60 mg, Oral, DAILY **FOLLOWED BY** [START ON 4/25/2023] predniSONE (DELTASONE) tablet 40 mg, 40 mg, Oral, DAILY **FOLLOWED BY** [START ON 4/28/2023] predniSONE (DELTASONE) tablet 20 mg, 20 mg, Oral, DAILY, Dain Carrera M.D.    senna-docusate (PERICOLACE or SENOKOT S) 8.6-50 MG per tablet 2 Tablet, 2 Tablet, Oral, BID, 2 Tablet at 04/17/23 0429 **AND** polyethylene  glycol/lytes (MIRALAX) PACKET 1 Packet, 1 Packet, Oral, QDAY PRN **AND** magnesium hydroxide (MILK OF MAGNESIA) suspension 30 mL, 30 mL, Oral, QDAY PRN **AND** bisacodyl (DULCOLAX) suppository 10 mg, 10 mg, Rectal, QDAY PRN, Tono Frank M.D.    acetaminophen (Tylenol) tablet 650 mg, 650 mg, Oral, Q6HRS PRN, Tono Frank M.D., 650 mg at 04/16/23 1708    Notify provider if pain remains uncontrolled, , , CONTINUOUS **AND** Use the Numeric Rating Scale (NRS), Lucio-Baker Faces (WBF), or FLACC on regular floors and Critical-Care Pain Observation Tool (CPOT) on ICUs/Trauma to assess pain, , , CONTINUOUS **AND** Pulse Ox, , , CONTINUOUS **AND** Pharmacy Consult Request ...Pain Management Review 1 Each, 1 Each, Other, PHARMACY TO DOSE **AND** If patient difficult to arouse and/or has respiratory depression (respiratory rate of 10 or less), stop any opiates that are currently infusing and call a Rapid Response., , , CONTINUOUS, Tono Frank M.D.    oxyCODONE immediate-release (ROXICODONE) tablet 2.5 mg, 2.5 mg, Oral, Q3HRS PRN, 2.5 mg at 04/16/23 0927 **OR** oxyCODONE immediate-release (ROXICODONE) tablet 5 mg, 5 mg, Oral, Q3HRS PRN, 5 mg at 04/15/23 0933 **OR** morphine 4 MG/ML injection 2 mg, 2 mg, Intravenous, Q3HRS PRN, Tono Frank M.D.    ondansetron (ZOFRAN) syringe/vial injection 4 mg, 4 mg, Intravenous, Q4HRS PRN, Tono Frank M.D.    ondansetron (ZOFRAN ODT) dispertab 4 mg, 4 mg, Oral, Q4HRS PRN, Tono Frank M.D.    promethazine (PHENERGAN) tablet 12.5-25 mg, 12.5-25 mg, Oral, Q4HRS PRN, Tono Frank M.D.    promethazine (PHENERGAN) suppository 12.5-25 mg, 12.5-25 mg, Rectal, Q4HRS PRN, Tono Frank M.D.    prochlorperazine (COMPAZINE) injection 5-10 mg, 5-10 mg, Intravenous, Q4HRS PRN, Tono Frank M.D.    Physical Examination:     General: Patient is awake, alert and interactive. No acute distress  Extremities:  clear, dry, intact, without peripheral  "edema    NEUROLOGICAL EXAM:     /72   Pulse 68   Temp 36.2 °C (97.2 °F) (Temporal)   Resp 16   Ht 1.778 m (5' 10\")   Wt 88.9 kg (195 lb 15.8 oz)   SpO2 95%   BMI 28.12 kg/m²       Mental status: Awake, alert and oriented.  Attention is intact.  Answers questions appropriately.  Language: Fluent with intact comprehension.  No dysarthria.  Cranial nerves:    Pupils are equal, round and reactive to light   Right eye (OD) abduction deficit.  Intact muscles of mastication    Intact facial sensation (LT & temp)   Face appears symmetric   Hearing is intact to conversation   Symmetric shoulder shrug   Symmetric elevation of the palate; uvula appears midline   Tongue protrusion is midline  Motor: Normal bulk and tone.  Symmetric antigravity against resistance in all 4 extremities.  No drift.  No abnormal movements or postures.   Reflexes: Deep tendon reflexes 2 and symmetric in the bilateral upper and lower extremities. Bilateral plantar responses are flexor.   Sensory: Intact to light-touch.  Coordination: No dysmetria.  Gait: Deferred due to patient preference    Objective Data:    Labs:  No results found for: PROTHROMBTM, INR   Lab Results   Component Value Date/Time    WBC 13.1 (H) 04/13/2023 04:12 PM    RBC 5.14 04/13/2023 04:12 PM    HEMOGLOBIN 15.8 04/13/2023 04:12 PM    HEMATOCRIT 47.3 04/13/2023 04:12 PM    MCV 92.0 04/13/2023 04:12 PM    MCH 30.7 04/13/2023 04:12 PM    MCHC 33.4 (L) 04/13/2023 04:12 PM    MPV 9.6 04/13/2023 04:12 PM    NEUTSPOLYS 69.80 04/13/2023 04:12 PM    LYMPHOCYTES 19.60 (L) 04/13/2023 04:12 PM    MONOCYTES 6.90 04/13/2023 04:12 PM    EOSINOPHILS 2.80 04/13/2023 04:12 PM    BASOPHILS 0.40 04/13/2023 04:12 PM      Lab Results   Component Value Date/Time    SODIUM 142 04/13/2023 04:12 PM    POTASSIUM 4.0 04/13/2023 04:12 PM    CHLORIDE 107 04/13/2023 04:12 PM    CO2 24 04/13/2023 04:12 PM    GLUCOSE 89 04/13/2023 04:12 PM    BUN 12 04/13/2023 04:12 PM    CREATININE 0.66 04/13/2023 " 04:12 PM    BUNCREATRAT 12.2 04/07/2023 07:45 PM      No results found for: CHOLSTRLTOT, LDL, HDL, TRIGLYCERIDE    Lab Results   Component Value Date/Time    ALKPHOSPHAT 120 (H) 04/13/2023 04:12 PM    ASTSGOT 15 04/13/2023 04:12 PM    ALTSGPT 23 04/13/2023 04:12 PM    TBILIRUBIN 0.5 04/13/2023 04:12 PM      B12 greater than 4000  TSH 0.490  HIV is nonreactive  CRP less than 0.30  ESR 5  FLACA not detected  Myeloperoxidase antibody 0  Serum proteinase 3 IgG 0     Latest Reference Range & Units 04/14/23 18:15   Number Of Tubes  3   Volume mL 14.0   Color-Body Fluid  Colorless   Character-Body Fluid  Clear   Supernatant Appearance  Colorless   Total WBC Count 0 - 10 cells/uL <1   Total RBC Count cells/uL <1   Crenated RBC % 0   Lymphs % 100   CSF Tube Number  3   Comments  see below   Glucose CSF 40 - 80 mg/dL 66   Total Protein, CSF 15 - 45 mg/dL 24     CSF-no organisms on Gram stain.  No growth on culture.    Imaging/Testing:    I interpreted and/or reviewed the patient's neuroimaging    MR-MRA NECK-W/O   Final Result      1. MRA OF THE CERVICAL VERTEBRAL AND CAROTID ARTERIES WITHIN NORMAL LIMITS WITH NO EVIDENCE OF FLOW LIMITING LESION.      MR-MRA HEAD-W/O   Final Result      1.  Unremarkable MRA [place] of [place]. No evidence of aneurysm or intracranial occlusive, stenotic, or vasculitic disease of medium or large vessels.   2.  Abnormal intermediate T2 signal occupying the right cavernous sinus and right parasellar region concordant with dural based meningeal enhancement and thickening seen on MRI brain and orbit studies from 4/14/2023. See earlier reports.      CT-CHEST (THORAX) WITH   Final Result      Negative CT scan of the chest with contrast.      Fleischner Society pulmonary nodule recommendations:   Not Applicable         MR-ORBITS, FACE, NECK-WITH   Final Result         Abnormal dural thickening and enhancement involving the right cavernous sinus, parasellar region, extending to the right tentorium.  Differential diagnosis includes idiopathic inflammatory pseudotumor, meningioma, inflammatory processes such as Wegener's    granulomatosis, sarcoidosis, less likely lymphoma.      MR-BRAIN-WITH & W/O   Final Result         Abnormal dural thickening and enhancement involving the right cavernous sinus, parasellar region and extending to the right tentorium. Differential diagnosis includes idiopathic inflammatory pseudotumor, meningioma, inflammatory processes such as    Wegener's granulomatosis,, sarcoidosis, less likely lymphoma.          Impression and Recommendations: Cavernous sinuses syndrome (right side)-unclear etiology.  Overall essentially an unknown remarkable work-up ruling out underlying infection, thrombosis, and dissection.  Presumed inflammatory process such as vasculitis, sarcoidosis and Amrion-Hunt.  Steroids were started yesterday; patient seems to be tolerating well without recognized adverse effects at this time.  Facial pain has resolved and double vision seems to be improving as well.  On exam continues to have a prominent abduction deficit (OD) without other clear neurologic findings.  Patient denies any new neurologic symptoms.  Recommendations as below.  - Continue prednisone as previously recommended- prednisone 100 mg daily for 3 days and then reduce the dose to 80 mg daily for 3 days and then reduce the dose to 60 mg daily for 3 days and then 40 mg daily for 3 days, and then 20 mg daily for 7 days and then 10 mg daily for 7 days.   - GI and infection prophylaxis as per medicine team.  - Follow-up pending labs- RAMÍREZ, ANCA  - Follow-up pending CSF studies- VZV PCR, ACE levels, VDRL, CSF cultures for TB. Cytology and flow cytometry if processed.   - Awaiting QuantiFERON test for tuberculosis.  - Tylenol as needed for headache.  - Patient may benefit from a eye patch to be used as needed.   - Will need close follow-up with neurology or neuro-ophthalmology as an outpatient.  Okay for discharge  tomorrow morning from a neurologic perspective if the patient continues to experience some improvement and without new neurologic findings.    Neurology will follow along while inpatient.      Pacheco Myrick MD  Neurohospitalist, Acute Care Services

## 2023-04-17 NOTE — PROGRESS NOTES
Hospital Medicine Daily Progress Note    Date of Service  4/17/2023    Chief Complaint  Lopez Engle is a 18 y.o. male admitted 4/13/2023 with Diplopia    Hospital Course  Admitted with diplopia. He was noted to have probable right lateral rectus palsy.    Interval Problem Update    Patient is alert oriented x3  Eye pain has resolved  Denies diplopia  Mild headache improved  On prednisone 100 mg daily started yesterday  Reviewed FLACA and ANCA negative  Discussed with neurology follow-up on pending serologies and QuantiFERON   I have ordered Bactrim for PJP prophylaxis  I have ordered neuro-ophthalmology referral and discussed with case management to assist with outpatient follow-up with PCP and ophthalmology  Reviewed test results and discussed plan of care with patient with assistance of certified  via videoconference    I have discussed this patient's plan of care and discharge plan at IDT rounds today with Case Management, Nursing, Nursing leadership, and other members of the IDT team.    Consultants/Specialty  neurology    Code Status  Full Code    Disposition  Patient is not medically cleared for discharge.   Anticipate discharge to to home with close outpatient follow-up.  I have placed the appropriate orders for post-discharge needs.    Review of Systems  Review of Systems   Constitutional:  Negative for fever.   Eyes:  Negative for double vision and pain.   Neurological:  Positive for headaches.   All other systems reviewed and are negative.     Physical Exam  Temp:  [35.8 °C (96.5 °F)-36.2 °C (97.2 °F)] 36.2 °C (97.2 °F)  Pulse:  [57-68] 68  Resp:  [16-17] 16  BP: (122-135)/(63-77) 131/72  SpO2:  [95 %-99 %] 95 %    Physical Exam  Vitals and nursing note reviewed.   Constitutional:       Appearance: He is well-developed. He is not diaphoretic.   HENT:      Head: Normocephalic and atraumatic.      Mouth/Throat:      Pharynx: No oropharyngeal exudate.   Eyes:      General: No scleral  icterus.        Right eye: No discharge.         Left eye: No discharge.      Conjunctiva/sclera: Conjunctivae normal.      Pupils: Pupils are equal, round, and reactive to light.      Comments: Right 6th nerve palsy   Neck:      Vascular: No JVD.      Trachea: No tracheal deviation.   Cardiovascular:      Rate and Rhythm: Normal rate and regular rhythm.      Heart sounds: No murmur heard.    No friction rub. No gallop.   Pulmonary:      Effort: Pulmonary effort is normal. No respiratory distress.      Breath sounds: Normal breath sounds. No stridor. No wheezing.   Chest:      Chest wall: No tenderness.   Abdominal:      General: Bowel sounds are normal. There is no distension.      Palpations: Abdomen is soft.      Tenderness: There is no abdominal tenderness. There is no rebound.   Musculoskeletal:         General: No tenderness.      Cervical back: Neck supple.   Skin:     General: Skin is warm and dry.      Nails: There is no clubbing.   Neurological:      Mental Status: He is alert and oriented to person, place, and time.      Cranial Nerves: Cranial nerve deficit present.      Motor: No weakness or abnormal muscle tone.   Psychiatric:         Behavior: Behavior normal.       Fluids  No intake or output data in the 24 hours ending 04/17/23 1325      Laboratory                            Imaging  MR-MRA NECK-W/O   Final Result      1. MRA OF THE CERVICAL VERTEBRAL AND CAROTID ARTERIES WITHIN NORMAL LIMITS WITH NO EVIDENCE OF FLOW LIMITING LESION.      MR-MRA HEAD-W/O   Final Result      1.  Unremarkable MRA [place] of [place]. No evidence of aneurysm or intracranial occlusive, stenotic, or vasculitic disease of medium or large vessels.   2.  Abnormal intermediate T2 signal occupying the right cavernous sinus and right parasellar region concordant with dural based meningeal enhancement and thickening seen on MRI brain and orbit studies from 4/14/2023. See earlier reports.      CT-CHEST (THORAX) WITH   Final Result       Negative CT scan of the chest with contrast.      Fleischner Society pulmonary nodule recommendations:   Not Applicable         MR-ORBITS, FACE, NECK-WITH   Final Result         Abnormal dural thickening and enhancement involving the right cavernous sinus, parasellar region, extending to the right tentorium. Differential diagnosis includes idiopathic inflammatory pseudotumor, meningioma, inflammatory processes such as Wegener's    granulomatosis, sarcoidosis, less likely lymphoma.      MR-BRAIN-WITH & W/O   Final Result         Abnormal dural thickening and enhancement involving the right cavernous sinus, parasellar region and extending to the right tentorium. Differential diagnosis includes idiopathic inflammatory pseudotumor, meningioma, inflammatory processes such as    Wegener's granulomatosis,, sarcoidosis, less likely lymphoma.             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 In review of yesterday's note (4/16/2023), there are no changes except as documented above.

## 2023-04-18 ENCOUNTER — PHARMACY VISIT (OUTPATIENT)
Dept: PHARMACY | Facility: MEDICAL CENTER | Age: 18
End: 2023-04-18
Payer: COMMERCIAL

## 2023-04-18 VITALS
RESPIRATION RATE: 18 BRPM | TEMPERATURE: 97 F | SYSTOLIC BLOOD PRESSURE: 118 MMHG | WEIGHT: 195.99 LBS | DIASTOLIC BLOOD PRESSURE: 64 MMHG | HEART RATE: 73 BPM | OXYGEN SATURATION: 97 % | HEIGHT: 70 IN | BODY MASS INDEX: 28.06 KG/M2

## 2023-04-18 LAB
CENTROMERE IGG TITR SER IF: 1 AU/ML (ref 0–40)
ENA JO1 AB TITR SER: 3 AU/ML (ref 0–40)
ENA SCL70 IGG SER QL: 1 AU/ML (ref 0–40)
ENA SM IGG SER-ACNC: 1 AU/ML (ref 0–40)
ENA SS-B IGG SER IA-ACNC: 1 AU/ML (ref 0–40)
RIBOSOMAL P AB SER-ACNC: 4 AU/ML (ref 0–40)
SPECIMEN SOURCE: NORMAL
SSA52 R0ENA AB IGG Q0420: 4 AU/ML (ref 0–40)
SSA60 R0ENA AB IGG Q0419: 1 AU/ML (ref 0–40)
U1 SNRNP IGG SER QL: 2 UNITS (ref 0–19)
VDRL CSF QL: NON REACTIVE
VZV DNA SPEC QL NAA+PROBE: NOT DETECTED

## 2023-04-18 PROCEDURE — A9270 NON-COVERED ITEM OR SERVICE: HCPCS | Performed by: PSYCHIATRY & NEUROLOGY

## 2023-04-18 PROCEDURE — 99239 HOSP IP/OBS DSCHRG MGMT >30: CPT | Performed by: INTERNAL MEDICINE

## 2023-04-18 PROCEDURE — A9270 NON-COVERED ITEM OR SERVICE: HCPCS | Performed by: FAMILY MEDICINE

## 2023-04-18 PROCEDURE — RXMED WILLOW AMBULATORY MEDICATION CHARGE: Performed by: INTERNAL MEDICINE

## 2023-04-18 PROCEDURE — 700102 HCHG RX REV CODE 250 W/ 637 OVERRIDE(OP): Performed by: PSYCHIATRY & NEUROLOGY

## 2023-04-18 PROCEDURE — 700102 HCHG RX REV CODE 250 W/ 637 OVERRIDE(OP): Performed by: FAMILY MEDICINE

## 2023-04-18 PROCEDURE — 700111 HCHG RX REV CODE 636 W/ 250 OVERRIDE (IP): Performed by: PSYCHIATRY & NEUROLOGY

## 2023-04-18 RX ORDER — ACETAMINOPHEN 325 MG/1
650 TABLET ORAL EVERY 6 HOURS PRN
Qty: 30 TABLET | Refills: 0 | COMMUNITY
Start: 2023-04-18

## 2023-04-18 RX ORDER — PREDNISONE 20 MG/1
TABLET ORAL
Qty: 38 TABLET | Refills: 0 | Status: SHIPPED | OUTPATIENT
Start: 2023-04-18 | End: 2023-05-11

## 2023-04-18 RX ORDER — OMEPRAZOLE 20 MG/1
20 CAPSULE, DELAYED RELEASE ORAL 2 TIMES DAILY
Qty: 60 CAPSULE | Refills: 0 | Status: SHIPPED | OUTPATIENT
Start: 2023-04-18

## 2023-04-18 RX ADMIN — OMEPRAZOLE 20 MG: 20 CAPSULE, DELAYED RELEASE ORAL at 04:27

## 2023-04-18 RX ADMIN — PREDNISONE 100 MG: 50 TABLET ORAL at 04:27

## 2023-04-18 RX ADMIN — DOCUSATE SODIUM 50 MG AND SENNOSIDES 8.6 MG 2 TABLET: 8.6; 5 TABLET, FILM COATED ORAL at 04:27

## 2023-04-18 NOTE — DISCHARGE INSTRUCTIONS
Discharge Instructions per Fannie Alonzo M.D.    Mr. Engle,    You were admitted to [facility] with eye pain and difficulty moving your eye and were diagnosed with lateral rectus palsy.  You were evaluated by neurology who recommended steroids.  You will be placed on a prednisone (steroid) taper for the next month.  Please see the taper below:     Prednisone 80 mg daily for 3 days and then 60 mg daily for 3 days then 40 mg daily for 3 days, and then 20 mg daily for 7 days and then 10 mg daily for 7 days.     While taking prednisone you will be taking omeprazole to protect her stomach.    You will need to follow-up with a primary care provider (referral has been placed) as well as neurology and neuro-ophthalmology.        Return to ER if you develop worsening eye pain, headaches, vision changes, new or worsening neurological symptoms or any other concerning symptoms.

## 2023-04-18 NOTE — CARE PLAN
The patient is Stable - Low risk of patient condition declining or worsening    Shift Goals  Clinical Goals: discharge  Patient Goals: discharge  Family Goals: KEISHA    Progress made toward(s) clinical / shift goals:  Anticipating discharge to home today. With close outpatient follow up

## 2023-04-18 NOTE — CARE PLAN
The patient is Stable - Low risk of patient condition declining or worsening    Shift Goals  Clinical Goals: safety  Patient Goals: safety  Family Goals: KEISHA    Progress made toward(s) clinical / shift goals:    Problem: Pain - Standard  Goal: Alleviation of pain or a reduction in pain to the patient’s comfort goal  Outcome: Progressing  Flowsheets (Taken 4/18/2023 1989)  Pain Rating Scale (NPRS): 0  Note: PATIENT REPORTED NO PAIN ON THE RIGHT EYE. PATIENT STATES FEELING BETTER.      Problem: Fall Risk  Goal: Patient will remain free from falls  Outcome: Progressing  Note: Bed is locked and in lowest position. Patient calls appropriately.

## 2023-04-19 NOTE — DISCHARGE SUMMARY
Discharge Summary    CHIEF COMPLAINT ON ADMISSION  Chief Complaint   Patient presents with    Eye Pain     Symptoms started 3 weeks ago. Right eye. Blurred vision when pt turns head to the right. Associated with sensation of pain/numbness surround the right eye.        Reason for Admission  eye pain numbness rt side forehead      Admission Date  4/13/2023    CODE STATUS  FULL    HPI & HOSPITAL COURSE    18-year-old male who presented on 4/13/2023 with blurry vision, eye pain and headache.  Patient with double vision and right eye pain for the past 3 weeks.  He was seen at [facility] where they obtained an MRI of the brain which was negative.  He was discharged with outpatient follow-up however continued to have double vision.  In the ED ophthalmology was called who recommended MRI brain and orbits with and without contrast which showed abnormal dural thickening and enhancement involving the right cavernous sinus, parasellar region and extending to the right tentorium.  Differential diagnosis includes idiopathic inflammatory pseudotumor, meningioma, inflammatory processes such as Wegener's granulomatosis,  Marion-Cuellar, sarcoid, less likely lymphoma.  LP was performed with CSF studies unremarkable (including autoimmune panel, VDRL, VZV PCR, ACE).  QuantiFERON gold was negative.  HIV negative.  CRP negative.  TSH normal.    Patient was started on prednisone 100 mg and will continue taper for the next month.  He was started on omeprazole.  He will have follow-up with neurology and neuro-ophthalmology.  A referral was placed for PCP.  I did discharge she had improvement in his headache, eye pain and double vision however did continue to have right lateral rectus palsy.      Therefore, he is discharged in good and stable condition to home with close outpatient follow-up.    The patient met 2-midnight criteria for an inpatient stay at the time of discharge.    Discharge Date  4/18/2023    FOLLOW UP ITEMS POST  DISCHARGE  PCP follow up  Neurology follow up  Neuro-ophthalmology follow up    DISCHARGE DIAGNOSES  Principal Problem:    Lateral rectus palsy POA: Yes  Resolved Problems:    * No resolved hospital problems. *      FOLLOW UP  No future appointments.  No follow-up provider specified.    MEDICATIONS ON DISCHARGE     Medication List        START taking these medications        Instructions   acetaminophen 325 MG Tabs  Commonly known as: Tylenol   Take 2 Tablets by mouth every 6 hours as needed for Mild Pain or Moderate Pain.  Dose: 650 mg     omeprazole 20 MG delayed-release capsule  Commonly known as: PRILOSEC   Hood 1 cápsula por vía oral 2 veces al día.  (Take 1 Capsule by mouth 2 times a day.)  Dose: 20 mg     predniSONE 20 MG Tabs  Start taking on: April 18, 2023  Commonly known as: DELTASONE  Next Dose Due: Tomorrow   Take 4 Tablets by mouth every day for 3 days, THEN 3 Tablets every day for 3 days, THEN 2 Tablets every day for 3 days, THEN 1 Tablet every day for 7 days, THEN 0.5 Tablets every day for 7 days.              Allergies  No Known Allergies    DIET  No orders of the defined types were placed in this encounter.      ACTIVITY  As tolerated.  Weight bearing as tolerated    CONSULTATIONS  Neurology    PROCEDURES  LP 4/14/23    Discharge exam:  Physical Exam  Constitutional:       General: He is not in acute distress.     Appearance: He is not ill-appearing, toxic-appearing or diaphoretic.   HENT:      Head: Normocephalic and atraumatic.      Nose: Nose normal.      Mouth/Throat:      Mouth: Mucous membranes are moist.   Eyes:      General: No scleral icterus.     Conjunctiva/sclera: Conjunctivae normal.      Pupils: Pupils are equal, round, and reactive to light.   Cardiovascular:      Rate and Rhythm: Normal rate and regular rhythm.      Heart sounds: No murmur heard.    No friction rub. No gallop.   Pulmonary:      Effort: Pulmonary effort is normal.      Breath sounds: Normal breath sounds.    Musculoskeletal:      Cervical back: Normal range of motion.      Right lower leg: No edema.      Left lower leg: No edema.   Neurological:      Mental Status: He is alert.      Cranial Nerves: Cranial nerve deficit (R lateral rectus palsy) present.      Motor: No weakness.   Psychiatric:         Mood and Affect: Mood normal.         Behavior: Behavior normal.         LABORATORY  Lab Results   Component Value Date    SODIUM 142 04/13/2023    POTASSIUM 4.0 04/13/2023    CHLORIDE 107 04/13/2023    CO2 24 04/13/2023    GLUCOSE 89 04/13/2023    BUN 12 04/13/2023    CREATININE 0.66 04/13/2023        Lab Results   Component Value Date    WBC 13.1 (H) 04/13/2023    HEMOGLOBIN 15.8 04/13/2023    HEMATOCRIT 47.3 04/13/2023    PLATELETCT 285 04/13/2023      MR-MRA NECK-W/O   Final Result       1. MRA OF THE CERVICAL VERTEBRAL AND CAROTID ARTERIES WITHIN NORMAL LIMITS WITH NO EVIDENCE OF FLOW LIMITING LESION.       MR-MRA HEAD-W/O   Final Result       1.  Unremarkable MRA [place] of [place]. No evidence of aneurysm or intracranial occlusive, stenotic, or vasculitic disease of medium or large vessels.   2.  Abnormal intermediate T2 signal occupying the right cavernous sinus and right parasellar region concordant with dural based meningeal enhancement and thickening seen on MRI brain and orbit studies from 4/14/2023. See earlier reports.       CT-CHEST (THORAX) WITH   Final Result       Negative CT scan of the chest with contrast.       Fleischner Society pulmonary nodule recommendations:   Not Applicable           MR-ORBITS, FACE, NECK-WITH   Final Result           Abnormal dural thickening and enhancement involving the right cavernous sinus, parasellar region, extending to the right tentorium. Differential diagnosis includes idiopathic inflammatory pseudotumor, meningioma, inflammatory processes such as Wegener's    granulomatosis, sarcoidosis, less likely lymphoma.       MR-BRAIN-WITH & W/O   Final Result           Abnormal  dural thickening and enhancement involving the right cavernous sinus, parasellar region and extending to the right tentorium. Differential diagnosis includes idiopathic inflammatory pseudotumor, meningioma, inflammatory processes such as    Wegener's granulomatosis,, sarcoidosis, less likely lymphoma.              Total time of the discharge process 35 minutes.

## 2023-04-28 ENCOUNTER — TELEPHONE (OUTPATIENT)
Dept: HEALTH INFORMATION MANAGEMENT | Facility: OTHER | Age: 18
End: 2023-04-28
Payer: COMMERCIAL

## 2023-04-30 ENCOUNTER — HOSPITAL ENCOUNTER (OUTPATIENT)
Dept: RADIOLOGY | Facility: MEDICAL CENTER | Age: 18
End: 2023-04-30
Payer: COMMERCIAL

## 2023-05-09 LAB
FUNGUS SPEC CULT: NORMAL
FUNGUS SPEC FUNGUS STN: NORMAL
SIGNIFICANT IND 70042: NORMAL
SITE SITE: NORMAL
SOURCE SOURCE: NORMAL

## 2023-05-27 LAB
MYCOBACTERIUM SPEC CULT: NORMAL
RHODAMINE-AURAMINE STN SPEC: NORMAL
SIGNIFICANT IND 70042: NORMAL
SITE SITE: NORMAL
SOURCE SOURCE: NORMAL